# Patient Record
Sex: FEMALE | Race: BLACK OR AFRICAN AMERICAN | Employment: FULL TIME | URBAN - METROPOLITAN AREA
[De-identification: names, ages, dates, MRNs, and addresses within clinical notes are randomized per-mention and may not be internally consistent; named-entity substitution may affect disease eponyms.]

---

## 2023-01-04 ENCOUNTER — HOSPITAL ENCOUNTER (OUTPATIENT)
Dept: PREADMISSION TESTING | Age: 48
Discharge: HOME | End: 2023-01-04
Payer: COMMERCIAL

## 2023-01-04 VITALS
HEART RATE: 86 BPM | HEIGHT: 62 IN | DIASTOLIC BLOOD PRESSURE: 74 MMHG | SYSTOLIC BLOOD PRESSURE: 122 MMHG | WEIGHT: 105.2 LBS | TEMPERATURE: 97.5 F | OXYGEN SATURATION: 99 % | BODY MASS INDEX: 19.36 KG/M2 | RESPIRATION RATE: 16 BRPM

## 2023-01-04 LAB
25(OH)D3 SERPL-MCNC: 13.3 NG/ML (ref 30–100)
ABO + RH BLD: NORMAL
ALBUMIN SERPL-MCNC: 4 G/DL (ref 3.5–5)
ALBUMIN/GLOB SERPL: 1.1 {RATIO} (ref 1.1–2.2)
ALP SERPL-CCNC: 58 U/L (ref 45–117)
ALT SERPL-CCNC: 32 U/L (ref 12–78)
ANION GAP SERPL CALC-SCNC: 6 MMOL/L (ref 5–15)
APPEARANCE UR: CLEAR
APTT PPP: 28.5 SEC (ref 22.1–31)
AST SERPL-CCNC: 20 U/L (ref 15–37)
BACTERIA URNS QL MICRO: ABNORMAL /HPF
BASOPHILS # BLD: 0.1 K/UL (ref 0–0.1)
BASOPHILS NFR BLD: 1 % (ref 0–1)
BILIRUB SERPL-MCNC: 1.3 MG/DL (ref 0.2–1)
BILIRUB UR QL: NEGATIVE
BLOOD GROUP ANTIBODIES SERPL: NORMAL
BUN SERPL-MCNC: 9 MG/DL (ref 6–20)
BUN/CREAT SERPL: 12 (ref 12–20)
CALCIUM SERPL-MCNC: 9.2 MG/DL (ref 8.5–10.1)
CHLORIDE SERPL-SCNC: 102 MMOL/L (ref 97–108)
CO2 SERPL-SCNC: 33 MMOL/L (ref 21–32)
COLOR UR: ABNORMAL
CREAT SERPL-MCNC: 0.75 MG/DL (ref 0.55–1.02)
DIFFERENTIAL METHOD BLD: ABNORMAL
EOSINOPHIL # BLD: 0.9 K/UL (ref 0–0.4)
EOSINOPHIL NFR BLD: 12 % (ref 0–7)
EPITH CASTS URNS QL MICRO: ABNORMAL /LPF
ERYTHROCYTE [DISTWIDTH] IN BLOOD BY AUTOMATED COUNT: 12.9 % (ref 11.5–14.5)
EST. AVERAGE GLUCOSE BLD GHB EST-MCNC: 108 MG/DL
GLOBULIN SER CALC-MCNC: 3.5 G/DL (ref 2–4)
GLUCOSE SERPL-MCNC: 77 MG/DL (ref 65–100)
GLUCOSE UR STRIP.AUTO-MCNC: NEGATIVE MG/DL
HBA1C MFR BLD: 5.4 % (ref 4–5.6)
HCT VFR BLD AUTO: 43.8 % (ref 35–47)
HGB BLD-MCNC: 14.3 G/DL (ref 11.5–16)
HGB UR QL STRIP: NEGATIVE
HYALINE CASTS URNS QL MICRO: ABNORMAL /LPF (ref 0–2)
IMM GRANULOCYTES # BLD AUTO: 0 K/UL (ref 0–0.04)
IMM GRANULOCYTES NFR BLD AUTO: 0 % (ref 0–0.5)
INR PPP: 1 (ref 0.9–1.1)
KETONES UR QL STRIP.AUTO: NEGATIVE MG/DL
LEUKOCYTE ESTERASE UR QL STRIP.AUTO: ABNORMAL
LYMPHOCYTES # BLD: 2.2 K/UL (ref 0.8–3.5)
LYMPHOCYTES NFR BLD: 29 % (ref 12–49)
MCH RBC QN AUTO: 28.8 PG (ref 26–34)
MCHC RBC AUTO-ENTMCNC: 32.6 G/DL (ref 30–36.5)
MCV RBC AUTO: 88.3 FL (ref 80–99)
MONOCYTES # BLD: 0.4 K/UL (ref 0–1)
MONOCYTES NFR BLD: 5 % (ref 5–13)
NEUTS SEG # BLD: 3.9 K/UL (ref 1.8–8)
NEUTS SEG NFR BLD: 53 % (ref 32–75)
NITRITE UR QL STRIP.AUTO: NEGATIVE
NRBC # BLD: 0 K/UL (ref 0–0.01)
NRBC BLD-RTO: 0 PER 100 WBC
PH UR STRIP: 6.5 [PH] (ref 5–8)
PLATELET # BLD AUTO: 269 K/UL (ref 150–400)
PMV BLD AUTO: 9.9 FL (ref 8.9–12.9)
POTASSIUM SERPL-SCNC: 3.3 MMOL/L (ref 3.5–5.1)
PROT SERPL-MCNC: 7.5 G/DL (ref 6.4–8.2)
PROT UR STRIP-MCNC: NEGATIVE MG/DL
PROTHROMBIN TIME: 10.7 SEC (ref 9–11.1)
RBC # BLD AUTO: 4.96 M/UL (ref 3.8–5.2)
RBC #/AREA URNS HPF: ABNORMAL /HPF (ref 0–5)
SODIUM SERPL-SCNC: 141 MMOL/L (ref 136–145)
SP GR UR REFRACTOMETRY: 1.02 (ref 1–1.03)
SPECIMEN EXP DATE BLD: NORMAL
THERAPEUTIC RANGE,PTTT: NORMAL SECS (ref 58–77)
UA: UC IF INDICATED,UAUC: ABNORMAL
UROBILINOGEN UR QL STRIP.AUTO: 1 EU/DL (ref 0.2–1)
WBC # BLD AUTO: 7.4 K/UL (ref 3.6–11)
WBC URNS QL MICRO: ABNORMAL /HPF (ref 0–4)

## 2023-01-04 PROCEDURE — 36415 COLL VENOUS BLD VENIPUNCTURE: CPT

## 2023-01-04 PROCEDURE — 86900 BLOOD TYPING SEROLOGIC ABO: CPT

## 2023-01-04 PROCEDURE — 85025 COMPLETE CBC W/AUTO DIFF WBC: CPT

## 2023-01-04 PROCEDURE — 83036 HEMOGLOBIN GLYCOSYLATED A1C: CPT

## 2023-01-04 PROCEDURE — 85610 PROTHROMBIN TIME: CPT

## 2023-01-04 PROCEDURE — 82306 VITAMIN D 25 HYDROXY: CPT

## 2023-01-04 PROCEDURE — 81001 URINALYSIS AUTO W/SCOPE: CPT

## 2023-01-04 PROCEDURE — 93005 ELECTROCARDIOGRAM TRACING: CPT

## 2023-01-04 PROCEDURE — 85730 THROMBOPLASTIN TIME PARTIAL: CPT

## 2023-01-04 PROCEDURE — 80053 COMPREHEN METABOLIC PANEL: CPT

## 2023-01-04 RX ORDER — LOSARTAN POTASSIUM 50 MG/1
50 TABLET ORAL EVERY EVENING
COMMUNITY

## 2023-01-04 RX ORDER — OMEPRAZOLE 40 MG/1
40 CAPSULE, DELAYED RELEASE ORAL DAILY
COMMUNITY

## 2023-01-04 RX ORDER — AZITHROMYCIN 250 MG/1
250 TABLET, FILM COATED ORAL DAILY
COMMUNITY

## 2023-01-04 RX ORDER — ATORVASTATIN CALCIUM 10 MG/1
10 TABLET, FILM COATED ORAL EVERY EVENING
COMMUNITY

## 2023-01-04 RX ORDER — CETIRIZINE HYDROCHLORIDE 10 MG/1
10 TABLET ORAL DAILY
COMMUNITY

## 2023-01-04 RX ORDER — MONTELUKAST SODIUM 10 MG/1
10 TABLET ORAL EVERY EVENING
COMMUNITY

## 2023-01-04 RX ORDER — HYDROCHLOROTHIAZIDE 12.5 MG/1
12.5 TABLET ORAL DAILY
COMMUNITY

## 2023-01-04 NOTE — PERIOP NOTES
[address] [facility] [address], [address] [facility]   MAIN OR                                  [phone]   MAIN PRE OP                          [phone]                                                                                AMBULATORY PRE OP          [phone]  PRE-ADMISSION TESTING    [phone]   Surgery Date:  1/17/23 tuesday       Is surgery arrival time given by surgeon? NO  If NO, [facility] staff will call you between 3 and 7pm the day before your surgery with your arrival time. (If your surgery is on a Monday, we will call you the Friday before.)    Call [phone] after 7pm Monday-Friday if you did not receive this call. INSTRUCTIONS BEFORE YOUR SURGERY   When You  Arrive Arrive at the [address] [facility] on the day of your surgery  Have your insurance card, photo ID, and any copayment (if needed)   Food   and   Drink NO food or drink after midnight the night before surgery    This means NO water, gum, mints, coffee, juice, etc.  No alcohol (beer, wine, liquor) 24 hours before and after surgery   Medications to   TAKE   Morning of Surgery MEDICATIONS TO TAKE THE MORNING OF SURGERY WITH A SIP OF WATER:   Albuterol if needed  Azelastine nasal spray  Omeprazole  Zyrtec  Fluticasone     Medications  To  STOP      7 days before surgery Non-Steroidal anti-inflammatory Drugs (NSAID's): for example, Ibuprofen (Advil, Motrin), Naproxen (Aleve)  Aspirin, if taking for pain   Herbal supplements, vitamins, and fish oil  Other:  (Pain medications not listed above, including Tylenol may be taken)   Blood  Thinners If you take  Aspirin, Plavix, Coumadin, or any blood-thinning or anti-blood clot medicine, talk to the doctor who prescribed the medications for pre-operative instructions.    Bathing Clothing  Jewelry  Valuables     If you shower the morning of surgery, please do not apply anything to your skin (lotions, powders, deodorant, or makeup, especially mascara)  Follow Chlorhexidine Care Fusion body wash instructions provided to you during PAT appointment. Begin 3 days prior to surgery. Do not shave or trim anywhere 24 hours before surgery  Wear your hair loose or down; no pony-tails, buns, or metal hair clips  Wear loose, comfortable, clean clothes  Wear glasses instead of contacts  Leave money, valuables, and jewelry, including body piercings, at home  If you were given an [employer], bring it on day of surgery. Going Home - or Spending the Night SAME-DAY SURGERY: You must have a responsible adult drive you home and stay with you 24 hours after surgery  ADMITS: If your doctor is keeping you in the hospital after surgery, leave personal belongings/luggage in your car until you have a hospital room number. Hospital discharge time is 12 noon  Drivers must be here before 12 noon unless you are told differently   Special Instructions none       Follow all instructions so your surgery wont be cancelled. Please, be on time. If a situation occurs and you are delayed the day of surgery, call [phone] or [phone]. If your physical condition changes (like a fever, cold, flu, etc.) call your surgeon. Home medication(s) reviewed and verified via   LIST   VERBAL   during PAT appointment. The patient was contacted by   IN-PERSON  The patient verbalizes understanding of all instructions and DOES NOT   need reinforcement.

## 2023-01-05 LAB
BACTERIA SPEC CULT: NORMAL
BACTERIA SPEC CULT: NORMAL
SERVICE CMNT-IMP: NORMAL

## 2023-01-08 LAB
ATRIAL RATE: 66 BPM
CALCULATED P AXIS, ECG09: 39 DEGREES
CALCULATED R AXIS, ECG10: 62 DEGREES
CALCULATED T AXIS, ECG11: 54 DEGREES
DIAGNOSIS, 93000: NORMAL
P-R INTERVAL, ECG05: 152 MS
Q-T INTERVAL, ECG07: 392 MS
QRS DURATION, ECG06: 92 MS
QTC CALCULATION (BEZET), ECG08: 410 MS
VENTRICULAR RATE, ECG03: 66 BPM

## 2023-01-10 NOTE — PERIOP NOTES
The patient attended the pre-operative spine class. The content of the class was presented using a power point presentation and visual demonstrations specific for patients undergoing surgical spine procedures of the neck and back. A pre-operative Patient education booklet specific to spine surgery was given to patient. Incentive spirometer and CHG bath kits were demonstrated in class. Day of surgery routine and expectations, hospital routine and expectations, nutrition, alcohol, nicotine, medications, infection control, pain management, DVT precautions and equipment, ice therapy, durable medical equipment, exercises, mobility expectations and precautions, home preparation and safety were reviewed in class. During class the attendees had opportunities to ask questions of the material presented as well as any concerns about their upcoming surgery. My contact information was shared with the patient to provide further information as requested by the patient related to their upcoming surgery.

## 2023-01-10 NOTE — PERIOP NOTES
Attempted to phone the patient to treat low Vitamin D level done 1/4/2023 but the patient's mailbox is full. I also attempted to call the patient's emergency contact who is her mother Basia Lopes and her mailbox for messages is full as well.

## 2023-01-16 ENCOUNTER — ANESTHESIA EVENT (OUTPATIENT)
Dept: SURGERY | Age: 48
End: 2023-01-16
Payer: COMMERCIAL

## 2023-01-17 ENCOUNTER — APPOINTMENT (OUTPATIENT)
Dept: GENERAL RADIOLOGY | Age: 48
End: 2023-01-17
Attending: ORTHOPAEDIC SURGERY
Payer: COMMERCIAL

## 2023-01-17 ENCOUNTER — HOSPITAL ENCOUNTER (OUTPATIENT)
Age: 48
Discharge: HOME | End: 2023-01-19
Attending: ORTHOPAEDIC SURGERY | Admitting: ORTHOPAEDIC SURGERY
Payer: COMMERCIAL

## 2023-01-17 ENCOUNTER — ANESTHESIA (OUTPATIENT)
Dept: SURGERY | Age: 48
End: 2023-01-17
Payer: COMMERCIAL

## 2023-01-17 DIAGNOSIS — M48.02 CERVICAL STENOSIS OF SPINAL CANAL: Primary | ICD-10-CM

## 2023-01-17 LAB — HCG UR QL: NEGATIVE

## 2023-01-17 PROCEDURE — C1762 CONN TISS, HUMAN(INC FASCIA): HCPCS | Performed by: ORTHOPAEDIC SURGERY

## 2023-01-17 PROCEDURE — C1713 ANCHOR/SCREW BN/BN,TIS/BN: HCPCS | Performed by: ORTHOPAEDIC SURGERY

## 2023-01-17 PROCEDURE — 81025 URINE PREGNANCY TEST: CPT

## 2023-01-17 PROCEDURE — 74011000250 HC RX REV CODE- 250: Performed by: SPECIALIST

## 2023-01-17 PROCEDURE — 74011000250 HC RX REV CODE- 250: Performed by: ORTHOPAEDIC SURGERY

## 2023-01-17 PROCEDURE — 77030031139 HC SUT VCRL2 J&J -A: Performed by: ORTHOPAEDIC SURGERY

## 2023-01-17 PROCEDURE — 77030040922 HC BLNKT HYPOTHRM STRY -A

## 2023-01-17 PROCEDURE — 76010000171 HC OR TIME 2 TO 2.5 HR INTENSV-TIER 1: Performed by: ORTHOPAEDIC SURGERY

## 2023-01-17 PROCEDURE — 77030004391 HC BUR FLUT MEDT -C: Performed by: ORTHOPAEDIC SURGERY

## 2023-01-17 PROCEDURE — 74011250636 HC RX REV CODE- 250/636: Performed by: ANESTHESIOLOGY

## 2023-01-17 PROCEDURE — 77030030102 HC BIT DRL PYRNES K2M -B: Performed by: ORTHOPAEDIC SURGERY

## 2023-01-17 PROCEDURE — 76210000016 HC OR PH I REC 1 TO 1.5 HR: Performed by: ORTHOPAEDIC SURGERY

## 2023-01-17 PROCEDURE — 74011250636 HC RX REV CODE- 250/636: Performed by: SPECIALIST

## 2023-01-17 PROCEDURE — 77030002933 HC SUT MCRYL J&J -A: Performed by: ORTHOPAEDIC SURGERY

## 2023-01-17 PROCEDURE — 77030038692 HC WND DEB SYS IRMX -B: Performed by: ORTHOPAEDIC SURGERY

## 2023-01-17 PROCEDURE — 74011250636 HC RX REV CODE- 250/636: Performed by: ORTHOPAEDIC SURGERY

## 2023-01-17 PROCEDURE — 74011250637 HC RX REV CODE- 250/637: Performed by: ORTHOPAEDIC SURGERY

## 2023-01-17 PROCEDURE — 77030040361 HC SLV COMPR DVT MDII -B

## 2023-01-17 PROCEDURE — 77030008684 HC TU ET CUF COVD -B: Performed by: SPECIALIST

## 2023-01-17 PROCEDURE — 74011250636 HC RX REV CODE- 250/636: Performed by: NURSE ANESTHETIST, CERTIFIED REGISTERED

## 2023-01-17 PROCEDURE — 77030012407 HC DRN WND BARD -B: Performed by: ORTHOPAEDIC SURGERY

## 2023-01-17 PROCEDURE — C1889 IMPLANT/INSERT DEVICE, NOC: HCPCS | Performed by: ORTHOPAEDIC SURGERY

## 2023-01-17 PROCEDURE — 77030028271 HC SRGFL HEMSTAT MTRX KT J&J -C: Performed by: ORTHOPAEDIC SURGERY

## 2023-01-17 PROCEDURE — 2709999900 HC NON-CHARGEABLE SUPPLY: Performed by: ORTHOPAEDIC SURGERY

## 2023-01-17 PROCEDURE — 76060000035 HC ANESTHESIA 2 TO 2.5 HR: Performed by: ORTHOPAEDIC SURGERY

## 2023-01-17 DEVICE — ADVANCED BONE GRAFT SUBSTITUTE 5CC
Type: IMPLANTABLE DEVICE | Site: SPINE CERVICAL | Status: FUNCTIONAL
Brand: NANOSS ABGS

## 2023-01-17 DEVICE — SCREW SPNL ST 4X12 MM CONSTRN NS PYRENEES: Type: IMPLANTABLE DEVICE | Site: SPINE CERVICAL | Status: FUNCTIONAL

## 2023-01-17 DEVICE — PLATE SPNL L18MM BILAT ANTR CERV TI 1 LEV CONSTRN LO PROF: Type: IMPLANTABLE DEVICE | Site: SPINE CERVICAL | Status: FUNCTIONAL

## 2023-01-17 DEVICE — ALLOGRAFT BNE MOLD 5 CC VIABLE BNE MTRX VIBONE: Type: IMPLANTABLE DEVICE | Site: SPINE CERVICAL | Status: FUNCTIONAL

## 2023-01-17 DEVICE — CAGE SPNL W17XH8MM D14.5MM 6DEG ANT CERV INTBDY FUS LORD W/: Type: IMPLANTABLE DEVICE | Site: SPINE CERVICAL | Status: FUNCTIONAL

## 2023-01-17 RX ORDER — ALBUTEROL SULFATE 0.83 MG/ML
2.5 SOLUTION RESPIRATORY (INHALATION)
Status: DISCONTINUED | OUTPATIENT
Start: 2023-01-17 | End: 2023-01-19 | Stop reason: HOSPADM

## 2023-01-17 RX ORDER — ONDANSETRON 2 MG/ML
INJECTION INTRAMUSCULAR; INTRAVENOUS AS NEEDED
Status: DISCONTINUED | OUTPATIENT
Start: 2023-01-17 | End: 2023-01-17 | Stop reason: HOSPADM

## 2023-01-17 RX ORDER — ROCURONIUM BROMIDE 10 MG/ML
INJECTION, SOLUTION INTRAVENOUS AS NEEDED
Status: DISCONTINUED | OUTPATIENT
Start: 2023-01-17 | End: 2023-01-17 | Stop reason: HOSPADM

## 2023-01-17 RX ORDER — LIDOCAINE HYDROCHLORIDE 10 MG/ML
0.1 INJECTION, SOLUTION EPIDURAL; INFILTRATION; INTRACAUDAL; PERINEURAL AS NEEDED
Status: DISCONTINUED | OUTPATIENT
Start: 2023-01-17 | End: 2023-01-17 | Stop reason: HOSPADM

## 2023-01-17 RX ORDER — AMOXICILLIN 250 MG
1 CAPSULE ORAL 2 TIMES DAILY
Status: DISCONTINUED | OUTPATIENT
Start: 2023-01-18 | End: 2023-01-19 | Stop reason: HOSPADM

## 2023-01-17 RX ORDER — GLYCOPYRROLATE 0.2 MG/ML
INJECTION INTRAMUSCULAR; INTRAVENOUS AS NEEDED
Status: DISCONTINUED | OUTPATIENT
Start: 2023-01-17 | End: 2023-01-17 | Stop reason: HOSPADM

## 2023-01-17 RX ORDER — ATORVASTATIN CALCIUM 10 MG/1
10 TABLET, FILM COATED ORAL EVERY EVENING
Status: DISCONTINUED | OUTPATIENT
Start: 2023-01-17 | End: 2023-01-19 | Stop reason: HOSPADM

## 2023-01-17 RX ORDER — MIDAZOLAM HYDROCHLORIDE 1 MG/ML
INJECTION, SOLUTION INTRAMUSCULAR; INTRAVENOUS AS NEEDED
Status: DISCONTINUED | OUTPATIENT
Start: 2023-01-17 | End: 2023-01-17 | Stop reason: HOSPADM

## 2023-01-17 RX ORDER — SODIUM CHLORIDE 0.9 % (FLUSH) 0.9 %
5-40 SYRINGE (ML) INJECTION EVERY 8 HOURS
Status: DISCONTINUED | OUTPATIENT
Start: 2023-01-17 | End: 2023-01-17 | Stop reason: HOSPADM

## 2023-01-17 RX ORDER — DIPHENHYDRAMINE HYDROCHLORIDE 50 MG/ML
12.5 INJECTION, SOLUTION INTRAMUSCULAR; INTRAVENOUS
Status: DISCONTINUED | OUTPATIENT
Start: 2023-01-17 | End: 2023-01-19 | Stop reason: HOSPADM

## 2023-01-17 RX ORDER — SODIUM CHLORIDE 0.9 % (FLUSH) 0.9 %
5-40 SYRINGE (ML) INJECTION EVERY 8 HOURS
Status: DISCONTINUED | OUTPATIENT
Start: 2023-01-17 | End: 2023-01-19 | Stop reason: HOSPADM

## 2023-01-17 RX ORDER — ACETAMINOPHEN 325 MG/1
650 TABLET ORAL
Status: DISCONTINUED | OUTPATIENT
Start: 2023-01-17 | End: 2023-01-19 | Stop reason: HOSPADM

## 2023-01-17 RX ORDER — CETIRIZINE HYDROCHLORIDE 10 MG/1
10 TABLET ORAL DAILY
Status: DISCONTINUED | OUTPATIENT
Start: 2023-01-18 | End: 2023-01-19 | Stop reason: HOSPADM

## 2023-01-17 RX ORDER — SODIUM CHLORIDE, SODIUM LACTATE, POTASSIUM CHLORIDE, CALCIUM CHLORIDE 600; 310; 30; 20 MG/100ML; MG/100ML; MG/100ML; MG/100ML
100 INJECTION, SOLUTION INTRAVENOUS CONTINUOUS
Status: DISCONTINUED | OUTPATIENT
Start: 2023-01-17 | End: 2023-01-17 | Stop reason: HOSPADM

## 2023-01-17 RX ORDER — SODIUM CHLORIDE 0.9 % (FLUSH) 0.9 %
5-40 SYRINGE (ML) INJECTION AS NEEDED
Status: DISCONTINUED | OUTPATIENT
Start: 2023-01-17 | End: 2023-01-19 | Stop reason: HOSPADM

## 2023-01-17 RX ORDER — HYDROMORPHONE HYDROCHLORIDE 1 MG/ML
0.5 INJECTION, SOLUTION INTRAMUSCULAR; INTRAVENOUS; SUBCUTANEOUS
Status: ACTIVE | OUTPATIENT
Start: 2023-01-17 | End: 2023-01-18

## 2023-01-17 RX ORDER — NALOXONE HYDROCHLORIDE 0.4 MG/ML
0.2 INJECTION, SOLUTION INTRAMUSCULAR; INTRAVENOUS; SUBCUTANEOUS
Status: DISCONTINUED | OUTPATIENT
Start: 2023-01-17 | End: 2023-01-17 | Stop reason: HOSPADM

## 2023-01-17 RX ORDER — PANTOPRAZOLE SODIUM 40 MG/1
40 TABLET, DELAYED RELEASE ORAL
Status: DISCONTINUED | OUTPATIENT
Start: 2023-01-18 | End: 2023-01-19 | Stop reason: HOSPADM

## 2023-01-17 RX ORDER — SODIUM CHLORIDE, SODIUM LACTATE, POTASSIUM CHLORIDE, CALCIUM CHLORIDE 600; 310; 30; 20 MG/100ML; MG/100ML; MG/100ML; MG/100ML
125 INJECTION, SOLUTION INTRAVENOUS CONTINUOUS
Status: DISCONTINUED | OUTPATIENT
Start: 2023-01-17 | End: 2023-01-17 | Stop reason: HOSPADM

## 2023-01-17 RX ORDER — OXYCODONE HYDROCHLORIDE 5 MG/1
10 TABLET ORAL
Status: DISCONTINUED | OUTPATIENT
Start: 2023-01-17 | End: 2023-01-18

## 2023-01-17 RX ORDER — AZELASTINE 1 MG/ML
1 SPRAY, METERED NASAL DAILY
Status: DISCONTINUED | OUTPATIENT
Start: 2023-01-18 | End: 2023-01-19 | Stop reason: HOSPADM

## 2023-01-17 RX ORDER — MONTELUKAST SODIUM 10 MG/1
10 TABLET ORAL EVERY EVENING
Status: DISCONTINUED | OUTPATIENT
Start: 2023-01-17 | End: 2023-01-19 | Stop reason: HOSPADM

## 2023-01-17 RX ORDER — FENTANYL CITRATE 50 UG/ML
INJECTION, SOLUTION INTRAMUSCULAR; INTRAVENOUS AS NEEDED
Status: DISCONTINUED | OUTPATIENT
Start: 2023-01-17 | End: 2023-01-17 | Stop reason: HOSPADM

## 2023-01-17 RX ORDER — FLUMAZENIL 0.1 MG/ML
0.2 INJECTION INTRAVENOUS
Status: DISCONTINUED | OUTPATIENT
Start: 2023-01-17 | End: 2023-01-17 | Stop reason: HOSPADM

## 2023-01-17 RX ORDER — CYCLOBENZAPRINE HCL 10 MG
10 TABLET ORAL
Status: DISCONTINUED | OUTPATIENT
Start: 2023-01-17 | End: 2023-01-19 | Stop reason: HOSPADM

## 2023-01-17 RX ORDER — OXYCODONE HYDROCHLORIDE 5 MG/1
5 TABLET ORAL
Status: DISCONTINUED | OUTPATIENT
Start: 2023-01-17 | End: 2023-01-18

## 2023-01-17 RX ORDER — FLUTICASONE PROPIONATE 50 MCG
2 SPRAY, SUSPENSION (ML) NASAL 2 TIMES DAILY
Status: DISCONTINUED | OUTPATIENT
Start: 2023-01-17 | End: 2023-01-19 | Stop reason: HOSPADM

## 2023-01-17 RX ORDER — NALOXONE HYDROCHLORIDE 0.4 MG/ML
0.4 INJECTION, SOLUTION INTRAMUSCULAR; INTRAVENOUS; SUBCUTANEOUS AS NEEDED
Status: DISCONTINUED | OUTPATIENT
Start: 2023-01-17 | End: 2023-01-19 | Stop reason: HOSPADM

## 2023-01-17 RX ORDER — SODIUM CHLORIDE, SODIUM LACTATE, POTASSIUM CHLORIDE, CALCIUM CHLORIDE 600; 310; 30; 20 MG/100ML; MG/100ML; MG/100ML; MG/100ML
INJECTION, SOLUTION INTRAVENOUS
Status: DISCONTINUED | OUTPATIENT
Start: 2023-01-17 | End: 2023-01-17 | Stop reason: HOSPADM

## 2023-01-17 RX ORDER — SODIUM CHLORIDE 9 MG/ML
125 INJECTION, SOLUTION INTRAVENOUS CONTINUOUS
Status: DISCONTINUED | OUTPATIENT
Start: 2023-01-17 | End: 2023-01-19 | Stop reason: HOSPADM

## 2023-01-17 RX ORDER — DEXAMETHASONE SODIUM PHOSPHATE 4 MG/ML
INJECTION, SOLUTION INTRA-ARTICULAR; INTRALESIONAL; INTRAMUSCULAR; INTRAVENOUS; SOFT TISSUE AS NEEDED
Status: DISCONTINUED | OUTPATIENT
Start: 2023-01-17 | End: 2023-01-17 | Stop reason: HOSPADM

## 2023-01-17 RX ORDER — HYDROCHLOROTHIAZIDE 25 MG/1
12.5 TABLET ORAL DAILY
Status: DISCONTINUED | OUTPATIENT
Start: 2023-01-18 | End: 2023-01-19 | Stop reason: HOSPADM

## 2023-01-17 RX ORDER — ONDANSETRON 2 MG/ML
4 INJECTION INTRAMUSCULAR; INTRAVENOUS
Status: DISPENSED | OUTPATIENT
Start: 2023-01-17 | End: 2023-01-18

## 2023-01-17 RX ORDER — PROPOFOL 10 MG/ML
INJECTION, EMULSION INTRAVENOUS AS NEEDED
Status: DISCONTINUED | OUTPATIENT
Start: 2023-01-17 | End: 2023-01-17 | Stop reason: HOSPADM

## 2023-01-17 RX ORDER — LOSARTAN POTASSIUM 50 MG/1
50 TABLET ORAL EVERY EVENING
Status: DISCONTINUED | OUTPATIENT
Start: 2023-01-17 | End: 2023-01-19 | Stop reason: HOSPADM

## 2023-01-17 RX ORDER — ONDANSETRON 2 MG/ML
4 INJECTION INTRAMUSCULAR; INTRAVENOUS AS NEEDED
Status: DISCONTINUED | OUTPATIENT
Start: 2023-01-17 | End: 2023-01-17 | Stop reason: HOSPADM

## 2023-01-17 RX ORDER — FACIAL-BODY WIPES
10 EACH TOPICAL DAILY PRN
Status: DISCONTINUED | OUTPATIENT
Start: 2023-01-19 | End: 2023-01-19 | Stop reason: HOSPADM

## 2023-01-17 RX ORDER — HYDROMORPHONE HYDROCHLORIDE 1 MG/ML
.5-1 INJECTION, SOLUTION INTRAMUSCULAR; INTRAVENOUS; SUBCUTANEOUS
Status: DISCONTINUED | OUTPATIENT
Start: 2023-01-17 | End: 2023-01-17 | Stop reason: HOSPADM

## 2023-01-17 RX ORDER — POLYETHYLENE GLYCOL 3350 17 G/17G
17 POWDER, FOR SOLUTION ORAL DAILY
Status: DISCONTINUED | OUTPATIENT
Start: 2023-01-18 | End: 2023-01-19 | Stop reason: HOSPADM

## 2023-01-17 RX ORDER — SODIUM CHLORIDE 0.9 % (FLUSH) 0.9 %
5-40 SYRINGE (ML) INJECTION AS NEEDED
Status: DISCONTINUED | OUTPATIENT
Start: 2023-01-17 | End: 2023-01-17 | Stop reason: HOSPADM

## 2023-01-17 RX ADMIN — PROPOFOL 150 MG: 10 INJECTION, EMULSION INTRAVENOUS at 11:29

## 2023-01-17 RX ADMIN — PROPOFOL 50 MCG/KG/MIN: 10 INJECTION, EMULSION INTRAVENOUS at 11:35

## 2023-01-17 RX ADMIN — FENTANYL CITRATE 50 MCG: 50 INJECTION, SOLUTION INTRAMUSCULAR; INTRAVENOUS at 11:36

## 2023-01-17 RX ADMIN — FENTANYL CITRATE 50 MCG: 50 INJECTION, SOLUTION INTRAMUSCULAR; INTRAVENOUS at 12:13

## 2023-01-17 RX ADMIN — HYDROMORPHONE HYDROCHLORIDE 0.5 MG: 1 INJECTION, SOLUTION INTRAMUSCULAR; INTRAVENOUS; SUBCUTANEOUS at 13:58

## 2023-01-17 RX ADMIN — FENTANYL CITRATE 50 MCG: 50 INJECTION, SOLUTION INTRAMUSCULAR; INTRAVENOUS at 11:25

## 2023-01-17 RX ADMIN — FENTANYL CITRATE 50 MCG: 50 INJECTION, SOLUTION INTRAMUSCULAR; INTRAVENOUS at 13:05

## 2023-01-17 RX ADMIN — ONDANSETRON HYDROCHLORIDE 4 MG: 2 SOLUTION INTRAMUSCULAR; INTRAVENOUS at 12:23

## 2023-01-17 RX ADMIN — VANCOMYCIN HYDROCHLORIDE 1000 MG: 1 INJECTION, POWDER, LYOPHILIZED, FOR SOLUTION INTRAVENOUS at 09:35

## 2023-01-17 RX ADMIN — SODIUM CHLORIDE 125 ML/HR: 9 INJECTION, SOLUTION INTRAVENOUS at 15:43

## 2023-01-17 RX ADMIN — SODIUM CHLORIDE, POTASSIUM CHLORIDE, SODIUM LACTATE AND CALCIUM CHLORIDE: 600; 310; 30; 20 INJECTION, SOLUTION INTRAVENOUS at 12:16

## 2023-01-17 RX ADMIN — HYDROMORPHONE HYDROCHLORIDE 0.5 MG: 1 INJECTION, SOLUTION INTRAMUSCULAR; INTRAVENOUS; SUBCUTANEOUS at 14:16

## 2023-01-17 RX ADMIN — SODIUM CHLORIDE, POTASSIUM CHLORIDE, SODIUM LACTATE AND CALCIUM CHLORIDE: 600; 310; 30; 20 INJECTION, SOLUTION INTRAVENOUS at 11:21

## 2023-01-17 RX ADMIN — ROCURONIUM BROMIDE 30 MG: 10 INJECTION INTRAVENOUS at 11:31

## 2023-01-17 RX ADMIN — VANCOMYCIN HYDROCHLORIDE 1000 MG: 1 INJECTION, POWDER, LYOPHILIZED, FOR SOLUTION INTRAVENOUS at 21:22

## 2023-01-17 RX ADMIN — SODIUM CHLORIDE, PRESERVATIVE FREE 10 ML: 5 INJECTION INTRAVENOUS at 21:23

## 2023-01-17 RX ADMIN — SODIUM CHLORIDE 120 MCG: 9 INJECTION, SOLUTION INTRAVENOUS at 12:53

## 2023-01-17 RX ADMIN — OXYCODONE HYDROCHLORIDE 10 MG: 5 TABLET ORAL at 21:39

## 2023-01-17 RX ADMIN — MIDAZOLAM HYDROCHLORIDE 2 MG: 1 INJECTION, SOLUTION INTRAMUSCULAR; INTRAVENOUS at 11:20

## 2023-01-17 RX ADMIN — LOSARTAN POTASSIUM 50 MG: 50 TABLET, FILM COATED ORAL at 21:22

## 2023-01-17 RX ADMIN — DEXAMETHASONE SODIUM PHOSPHATE 4 MG: 4 INJECTION, SOLUTION INTRAMUSCULAR; INTRAVENOUS at 12:23

## 2023-01-17 RX ADMIN — MONTELUKAST 10 MG: 10 TABLET, FILM COATED ORAL at 21:22

## 2023-01-17 RX ADMIN — OXYCODONE HYDROCHLORIDE 10 MG: 5 TABLET ORAL at 17:40

## 2023-01-17 RX ADMIN — GLYCOPYRROLATE 0.2 MG: 0.2 INJECTION INTRAMUSCULAR; INTRAVENOUS at 11:23

## 2023-01-17 RX ADMIN — ATORVASTATIN CALCIUM 10 MG: 10 TABLET, FILM COATED ORAL at 21:22

## 2023-01-17 RX ADMIN — FLUTICASONE PROPIONATE 2 SPRAY: 50 SPRAY, METERED NASAL at 21:39

## 2023-01-17 RX ADMIN — ALBUTEROL SULFATE 2.5 MG: 2.5 SOLUTION RESPIRATORY (INHALATION) at 17:24

## 2023-01-17 RX ADMIN — FENTANYL CITRATE 50 MCG: 50 INJECTION, SOLUTION INTRAMUSCULAR; INTRAVENOUS at 13:06

## 2023-01-17 RX ADMIN — SODIUM CHLORIDE, POTASSIUM CHLORIDE, SODIUM LACTATE AND CALCIUM CHLORIDE 100 ML/HR: 600; 310; 30; 20 INJECTION, SOLUTION INTRAVENOUS at 09:15

## 2023-01-17 NOTE — OP NOTES
Operative Note    Patient: Verónica Spaulding  YOB: 1975  MRN: [id number]    Date of Procedure: 1/17/2023     Pre-Op Diagnosis: CERVICAL RADICULITIS, CERVICAL SPINAL STENOSIS    Post-Op Diagnosis: Same as preoperative diagnosis. Procedure(s):  C5-C6 ANTERIOR CERVICAL DISCECTOMY AND FUSION WITH INSTRUMENTATION    Surgeon(s):  Tonya Fraire MD    Surgical Assistant: Physician Assistant: CHARLA Adame  Surg Asst-1: Paulina Faulkner    Anesthesia: General     Estimated Blood Loss (mL):  less than 50     Complications: None    Specimens: * No specimens in log *     Implants:   Implant Name Type Inv. Item Serial No.  Lot No. LRB No. Used Action   ALLOGRAFT BNE MOLD 5 CC VIABLE BNE MTRX VIBONE - [id number]  ALLOGRAFT BNE MOLD 5 CC VIABLE BNE MTRX VIBONE [id number] [employer] N/A N/A 1 Implanted   GRAFT BONE SUBSTITUTE 5CC BIOACTIVE NANOSS - SNA  GRAFT BONE SUBSTITUTE 5CC BIOACTIVE NANOSS NA [employer] INC_WD [id number] N/A 1 Implanted   CAGE SPNL [id number] D14.5MM 6DEG ANT CERV INTBDY FUS LORD W/ - SNA  CAGE SPNL [id number] D14.5MM 6DEG ANT CERV INTBDY FUS LORD W/ NA [employer] [id number] N/A 1 Implanted   PLATE SPNL [id number] BILAT ANTR CERV TI 1 LEV CONSTRN LO PROF - SNA  PLATE SPNL [id number] BILAT ANTR CERV TI 1 LEV CONSTRN LO PROF NA K2M INC_WD NA N/A 1 Implanted   SCREW SPNL ST 4X12 MM CONSTRN NS PYRENEES - SNA  SCREW SPNL ST 4X12 MM CONSTRN NS PYRENEES NA ARNALDO SPINE HOWM_WD NA N/A 4 Implanted       Drains:   Hemovac Anterior; Left Neck (Active)       Findings: as above    Detailed Description of Procedure: [address]  [address], [address] [facility]    OPERATIVE REPORT      NAME: Verónica Spaulding    AGE: 52 y.o.     YOB: 1975    MEDICAL RECORD NUMBER: [id number]    DATE OF SURGERY: 1/17/2023    OPERATIVE REPORT     PREOPERATIVE DIAGNOSIS: Cervical stenosis     POSTOPERATIVE DIAGNOSIS: Cervical stenosis    OPERATIVE PROCEDURE: C5 to C6 anterior cervical diskectomy and fusion with instrumentation and application of interbody spacer at C5-C6    SURGEON: Anitra Castillo MD     ASSISTANT: CHARLA Bray     ANESTHESIA: General    COMPLICATIONS: None    ESTIMATED BLOOD LOSS: 40 cc    INSTRUMENTATION: K2M plate, RTI spacer    Specimens - no    INDICATION FOR PROCEDURE: The patient is a very pleasant 52 y.o. female with cervical stenosis. The patient elected to proceed with operative intervention. She was aware of the risks, benefits, and alternatives. She was provided informed consent. PROCEDURE: The patient was identified in the preoperative holding area. The anterior cervical spine was marked by me. She was transferred to the operating room where general anesthesia was given. She was also given perioperative ancef antibiotics. The patient was placed supine on the operating room table. All bony prominences were well-padded. The shoulders were taped. The anterior cervical spine was prepped and draped in the usual standard fashion. We performed a surgical time-out. I made a skin incision on the left side. It was transverse. I exposed the anterior cervical spine. I placed a needle into the disk space to verify our level. I exposed the disc space with electrocautery from uncus to uncus. I brought in the operating room microscope. I performed a diskectomy at C5-C6. I decompressed the spinal cord and nerve roots bilaterally. I prepared the endplates to bleeding bone. We had good hemostasis. I performed trial sizing. I placed a spacer into C5-C6 with the appropriate amount of tension and alignment. The spacer had allograft. I then placed an anterior cervical plate into C5 and C6. The screws were locked to the plate according to the manufacture's specification.  The instrumentation is a standalone device and is separate instrumentation from the device anchoring for the interbody biomechanical device placed into the discectomy defect for purposes of a spinal fusion. We had good hemostasis. I copiously irrigated the entire wound. I placed a deep drain. The wound was closed with 3-0 Vicryl and 4-0 Monocryl. A sterile dressing was applied. The patient was extubated and transferred to the recovery room in good medical condition. The PA assisted with retraction and closure. I, Dr. Mercy Barragan, performed the above procedures.      Mercy Barragan MD  1/17/2023      Electronically Signed by Mercy Barragan MD on 1/17/2023 at 1:16 PM

## 2023-01-17 NOTE — ANESTHESIA POSTPROCEDURE EVALUATION
Procedure(s):  C5-C6 ANTERIOR CERVICAL DISCECTOMY AND FUSION WITH INSTRUMENTATION. general    Anesthesia Post Evaluation      Multimodal analgesia: multimodal analgesia not used between 6 hours prior to anesthesia start to PACU discharge  Patient location during evaluation: bedside  Patient participation: complete - patient participated  Level of consciousness: awake  Pain score: 0  Pain management: satisfactory to patient  Airway patency: patent  Anesthetic complications: no  Cardiovascular status: acceptable  Respiratory status: acceptable  Hydration status: acceptable  Post anesthesia nausea and vomiting:  controlled  Final Post Anesthesia Temperature Assessment:  Normothermia (36.0-37.5 degrees C)      INITIAL Post-op Vital signs:   Vitals Value Taken Time   /67 01/17/23 1440   Temp 36.4 °C (97.5 °F) 01/17/23 1330   Pulse 67 01/17/23 1443   Resp 9 01/17/23 1443   SpO2 97 % 01/17/23 1443   Vitals shown include unvalidated device data.

## 2023-01-17 NOTE — PERIOP NOTES
Dr Ami Ga notified of pt being highly allergic to pcn and per pharmacist they recommend vancomycin instead.  Orders recvd

## 2023-01-17 NOTE — H&P
Date of Surgery Update: Duong Simon was seen and examined. History and physical has been reviewed. The patient has been examined.  There have been no significant clinical changes since the completion of the originally dated History and Physical.    Signed By: Dorcas Calzada MD     January 17, 2023 9:51 AM Dion Ace presents today for   Chief Complaint   Patient presents with    Foot Swelling     Patient reports left foot swelling x 1 day. Patient denies any injury. Coordination of Care:  1. Have you been to the ER, urgent care clinic since your last visit? Hospitalized since your last visit? no    2. Have you seen or consulted any other health care providers outside of the [facility] since your last visit? Include any pap smears or colon screening.  yes

## 2023-01-17 NOTE — PERIOP NOTES
Report given to ALESIA Oreilly RN. Care transferred at this time. Patient transported to preop holding area room 17.

## 2023-01-17 NOTE — ANESTHESIA PREPROCEDURE EVALUATION
Relevant Problems   No relevant active problems       Anesthetic History   No history of anesthetic complications            Review of Systems / Medical History  Patient summary reviewed, nursing notes reviewed and pertinent labs reviewed    Pulmonary            Asthma     Comments: URI several weeks ago, finished antibiotics a couple weeks ago, feels like she is at baseline now   Neuro/Psych   Within defined limits           Cardiovascular    Hypertension                   GI/Hepatic/Renal     GERD: well controlled           Endo/Other  Within defined limits           Other Findings              Physical Exam    Airway  Mallampati: II  TM Distance: 4 - 6 cm  Neck ROM: normal range of motion   Mouth opening: Normal     Cardiovascular    Rhythm: regular  Rate: normal         Dental    Dentition: Lower dentition intact and Upper dentition intact     Pulmonary  Breath sounds clear to auscultation               Abdominal         Other Findings            Anesthetic Plan    ASA: 3  Anesthesia type: general          Induction: Intravenous  Anesthetic plan and risks discussed with: Patient

## 2023-01-17 NOTE — ANESTHESIA POSTPROCEDURE EVALUATION
Procedure(s):  C5-C6 ANTERIOR CERVICAL DISCECTOMY AND FUSION WITH INSTRUMENTATION. general    Anesthesia Post Evaluation        Patient location during evaluation: PACU  Level of consciousness: awake  Pain management: adequate  Airway patency: patent  Anesthetic complications: no  Cardiovascular status: acceptable  Respiratory status: acceptable  Hydration status: acceptable  Post anesthesia nausea and vomiting:  none      INITIAL Post-op Vital signs:   Vitals Value Taken Time   /67 01/17/23 1440   Temp 36.4 °C (97.5 °F) 01/17/23 1330   Pulse 67 01/17/23 1443   Resp 9 01/17/23 1443   SpO2 97 % 01/17/23 1443   Vitals shown include unvalidated device data.

## 2023-01-18 LAB
ANION GAP SERPL CALC-SCNC: 8 MMOL/L (ref 5–15)
BUN SERPL-MCNC: 6 MG/DL (ref 6–20)
BUN/CREAT SERPL: 9 (ref 12–20)
CALCIUM SERPL-MCNC: 8 MG/DL (ref 8.5–10.1)
CHLORIDE SERPL-SCNC: 109 MMOL/L (ref 97–108)
CO2 SERPL-SCNC: 23 MMOL/L (ref 21–32)
CREAT SERPL-MCNC: 0.67 MG/DL (ref 0.55–1.02)
GLUCOSE SERPL-MCNC: 184 MG/DL (ref 65–100)
HGB BLD-MCNC: 11 G/DL (ref 11.5–16)
POTASSIUM SERPL-SCNC: 3.6 MMOL/L (ref 3.5–5.1)
SODIUM SERPL-SCNC: 140 MMOL/L (ref 136–145)

## 2023-01-18 PROCEDURE — 36415 COLL VENOUS BLD VENIPUNCTURE: CPT

## 2023-01-18 PROCEDURE — 74011250636 HC RX REV CODE- 250/636: Performed by: ORTHOPAEDIC SURGERY

## 2023-01-18 PROCEDURE — 74011250637 HC RX REV CODE- 250/637: Performed by: ORTHOPAEDIC SURGERY

## 2023-01-18 PROCEDURE — 94761 N-INVAS EAR/PLS OXIMETRY MLT: CPT

## 2023-01-18 PROCEDURE — 74011250637 HC RX REV CODE- 250/637: Performed by: PHYSICIAN ASSISTANT

## 2023-01-18 PROCEDURE — 85018 HEMOGLOBIN: CPT

## 2023-01-18 PROCEDURE — 80048 BASIC METABOLIC PNL TOTAL CA: CPT

## 2023-01-18 PROCEDURE — 74011000250 HC RX REV CODE- 250: Performed by: ORTHOPAEDIC SURGERY

## 2023-01-18 PROCEDURE — 94640 AIRWAY INHALATION TREATMENT: CPT

## 2023-01-18 PROCEDURE — 74011250636 HC RX REV CODE- 250/636: Performed by: NURSE PRACTITIONER

## 2023-01-18 RX ORDER — HYDROCODONE BITARTRATE AND ACETAMINOPHEN 7.5; 325 MG/1; MG/1
1 TABLET ORAL
Status: DISCONTINUED | OUTPATIENT
Start: 2023-01-18 | End: 2023-01-19 | Stop reason: HOSPADM

## 2023-01-18 RX ORDER — SCOLOPAMINE TRANSDERMAL SYSTEM 1 MG/1
1 PATCH, EXTENDED RELEASE TRANSDERMAL
Status: DISCONTINUED | OUTPATIENT
Start: 2023-01-18 | End: 2023-01-19 | Stop reason: HOSPADM

## 2023-01-18 RX ORDER — HYDROCODONE BITARTRATE AND ACETAMINOPHEN 10; 325 MG/1; MG/1
1 TABLET ORAL
Status: DISCONTINUED | OUTPATIENT
Start: 2023-01-18 | End: 2023-01-19 | Stop reason: HOSPADM

## 2023-01-18 RX ORDER — ONDANSETRON 2 MG/ML
4 INJECTION INTRAMUSCULAR; INTRAVENOUS
Status: DISCONTINUED | OUTPATIENT
Start: 2023-01-18 | End: 2023-01-19 | Stop reason: HOSPADM

## 2023-01-18 RX ADMIN — FLUTICASONE PROPIONATE 2 SPRAY: 50 SPRAY, METERED NASAL at 21:06

## 2023-01-18 RX ADMIN — SODIUM CHLORIDE, PRESERVATIVE FREE 10 ML: 5 INJECTION INTRAVENOUS at 05:49

## 2023-01-18 RX ADMIN — CETIRIZINE HYDROCHLORIDE 10 MG: 10 TABLET, FILM COATED ORAL at 08:33

## 2023-01-18 RX ADMIN — OXYCODONE HYDROCHLORIDE 10 MG: 5 TABLET ORAL at 02:39

## 2023-01-18 RX ADMIN — AZELASTINE HYDROCHLORIDE 1 SPRAY: 137 SPRAY, METERED NASAL at 11:49

## 2023-01-18 RX ADMIN — SODIUM CHLORIDE, PRESERVATIVE FREE 10 ML: 5 INJECTION INTRAVENOUS at 21:06

## 2023-01-18 RX ADMIN — SODIUM CHLORIDE 125 ML/HR: 9 INJECTION, SOLUTION INTRAVENOUS at 17:04

## 2023-01-18 RX ADMIN — SENNOSIDES AND DOCUSATE SODIUM 1 TABLET: 50; 8.6 TABLET ORAL at 08:33

## 2023-01-18 RX ADMIN — MONTELUKAST 10 MG: 10 TABLET, FILM COATED ORAL at 21:06

## 2023-01-18 RX ADMIN — FLUTICASONE PROPIONATE 2 SPRAY: 50 SPRAY, METERED NASAL at 08:36

## 2023-01-18 RX ADMIN — OXYCODONE HYDROCHLORIDE 10 MG: 5 TABLET ORAL at 05:49

## 2023-01-18 RX ADMIN — SODIUM CHLORIDE, PRESERVATIVE FREE 10 ML: 5 INJECTION INTRAVENOUS at 13:56

## 2023-01-18 RX ADMIN — LOSARTAN POTASSIUM 50 MG: 50 TABLET, FILM COATED ORAL at 21:06

## 2023-01-18 RX ADMIN — ATORVASTATIN CALCIUM 10 MG: 10 TABLET, FILM COATED ORAL at 21:06

## 2023-01-18 RX ADMIN — SENNOSIDES AND DOCUSATE SODIUM 1 TABLET: 50; 8.6 TABLET ORAL at 17:04

## 2023-01-18 RX ADMIN — PANTOPRAZOLE SODIUM 40 MG: 40 TABLET, DELAYED RELEASE ORAL at 05:49

## 2023-01-18 RX ADMIN — ONDANSETRON 4 MG: 2 INJECTION INTRAMUSCULAR; INTRAVENOUS at 13:56

## 2023-01-18 RX ADMIN — ALBUTEROL SULFATE 2.5 MG: 2.5 SOLUTION RESPIRATORY (INHALATION) at 00:14

## 2023-01-18 RX ADMIN — ONDANSETRON 4 MG: 2 INJECTION INTRAMUSCULAR; INTRAVENOUS at 07:39

## 2023-01-18 NOTE — PROGRESS NOTES
1/18/23  8:51 AM    CM reviewed EMR, no CM needs noted. [facility] Management Interventions  Mode of Transport at Discharge:  Other (see comment) (Family will transport at [employer])  Transition of Care Consult (CM Consult): Discharge Planning  Support Systems: Parent(s)  Discharge Location  Patient Expects to be Discharged to[de-identified] Home with family assistance

## 2023-01-18 NOTE — PROGRESS NOTES
Bedside and Verbal shift change report given to Yesica Gomes RN (oncoming nurse) by Khari Rodriguez RN (offgoing nurse). Report included the following information SBAR, Kardex, ED Summary, OR Summary, Intake/Output, MAR, and Recent Results.

## 2023-01-18 NOTE — DISCHARGE INSTRUCTIONS
Barber Sanchez MD  [address] [facility]  Office Phone: [phone]  Neck Surgery Discharge Instructions  Activities  You are going home a well person, be as active as possible. Your only exercise should be walking. Start with short frequent walks and increase your walking distance each day. Start with walking twice a day for 5 minutes and increase your distance each day 2-3 minutes until you reach 25 minutes twice a day. Limit the amount of time you sit to 20-30 minute intervals. Sitting for prolonged periods of time will be uncomfortable for you following your surgery. Do not lift anything over five pounds, and do not do any bending or straining. Avoid reaching overhead in this post-operative period  Do not do any neck exercises until you have been instructed by your doctor. When you are in the bed, you may lay on your back or on either side. Do not lie on your stomach. Continue using your incentive spirometer regularly for deep breathing exercises  You may resume sexual relations 3-4 weeks after your surgery, depending on how you are feeling. Diet  You may resume your normal diet. If your throat is sore, you may want to eat soft foods for a few days. Be sure to drink plenty of fluids, it is important to keep yourself hydrated. If you begin having trouble swallowing, call the office immediately. Avoid alcoholic beverages and ABSOLUTELY NO tobacco products. Tobacco products will interfere with your healing. If you continue to use tobacco, you may end up needing another surgery in the future. Medications  Do not take anti-inflammatory medications or aspirin unless instructed by your physician. (such as Advil/Ibuprofen/Motrin, Aleve/Naproxen/Naprosyn, Diclofenac, Celebrex, Meloxicam, Indomethacin, Goody's powder, BC powder etc.)  Take your pain medication as directed. You have been prescribed a muscle relaxer (Flexeril)-take this for posterior neck/shoulder pain/muscle spasms.    You have also been prescribed a narcotic pain medication (Norco). This has Tylenol in it. You should take this for moderate-severe incisional (anterior) neck pain. If your incisional/surgical pain is mild, you may SUBSTITUTE the Norco for plain Tylenol/acetaminophen. It is important to not take Norco with Tylenol, as there is Tylenol already in [address] [facility]. Do not exceed 4000mg of Tylenol in any 24 hour period. **  It is important to have regular bowel movements. Pain medications may cause constipation. Stool softeners, prune juice, and increasing your water and fiber intake may help in preventing constipation. Do NOT take laxatives if at all possible except in severe situations. It can results in a vicious cycle of constipation and diarrhea. Do not be alarmed if you still have some of the same symptoms you had prior to surgery. The nerves often require time to heal after the pressure has been relieved. You may experience pain in your shoulders or between your shoulder blades, which is common after this surgery. The level of pain you experience should improve as your body heals. *** VERY IMPORTANT - PLEASE READ*** Please monitor the supply of your pain medicine closely and if it looks like you're going to run out of pain medicine over the weekend please call the [facility] prior to the weekend to request a refill. The on call physician for nights and weekends CANNOT refill pain medicine. You will either have to wait until Monday morning when the office re-opens or you will have to go to an urgent care/ emergency room if you are in need of pain medicine. Driving  You may not drive or return to work until instructed by your physician. However, you may ride in the car for short periods of time. Neck collar  Wear your neck brace. You may remove it for short breaks, when eating or showering. You must keep the brace on while sleeping and when ambulating.     Showering  You may shower in approximately 5 days after your surgery if your incision is not draining. You may remove your brace during showers. Do not rub or apply lotion or ointments to the incision site. Do not use tub baths, swimming pools or Jacuzzis. Caring for your incision  Keep the clear, plastic dressing on until 3 days after surgery. At that point, if the incision is dry and without drainage, you may keep the wound open to air without cover. You may have steri-strips on your incision (small, white pieces of tape). Do not pull the steri-strips; they will fall off on their own after several days. If you have sutures or staples, they will be removed by home health or when you see your physician. Do not rub or apply any lotions or ointments to your incision site. Follow Up  FEB 9 2023 11:30 AM - Office Visit  [place], [place]       Notify your physician if you develop any of the following conditions:  Fever above 101 degrees for 24 hours. Nausea or vomiting. Severe headache. Inability to urinate. Loss of bowel or bladder control (sudden onset of incontinence). Changes in sensation in your extremities (numbness, tingling, loss of color). Severe pain or pain not relieved by medications. Redness, swelling, or drainage from your incision. Persistent pain in the chest.   Pain in the calf of either leg. Increased weakness (if this is greater than before your surgery). If you have any questions, contact your Orthopaedic Surgeons office. OFFICE OF DR. Pramod Alejandra   [phone]  [place] [address] [address], , [address], [address] [facility]     * WEAR YOUR BRACE AS ADVISED    * NO DRIVING UNTIL YOU ARE CLEARED TO DO SO BY YOUR SURGEON    * LIMIT LIFTING, BENDING AND TWISTING.  NO LIFTING MORE THAN 5 LBS    * MAKE SURE YOU ARE GETTING GOOD NUTRITION (Lean Protein, Vitamin D AND Calcium)    * DO NOT TAKE ANY NSAIDs FOR THE FIRST 3 MONTHS AFTER SURGERY (such as Advil/Ibuprofen/Motrin, Aleve/Naproxen/Naprosyn, Diclofenac, Celebrex, Meloxicam, Indomethacin, Goody's powder, BC powder etc.)    * NO NICOTINE PRODUCTS    * FULLY READ YOUR DISCHARGE INSTRUCTIONS

## 2023-01-18 NOTE — PERIOP NOTES
TRANSFER - OUT REPORT:    Verbal report given to RN Jordon(name) on Dmitry Hartman  being transferred to [facility] for routine post - op       Report consisted of patients Situation, Background, Assessment and   Recommendations(SBAR). Information from the following report(s) SBAR, OR Summary, Procedure Summary, Intake/Output, MAR, and Cardiac Rhythm nsr  was reviewed with the receiving nurse. Lines:   Peripheral IV 01/17/23 Anterior; Left Forearm (Active)   Site Assessment Clean, dry, & intact 01/17/23 1915   Phlebitis Assessment 0 01/17/23 1915   Infiltration Assessment 0 01/17/23 1915   Dressing Status Occlusive;Dry;Clean, dry, & intact 01/17/23 1915   Dressing Type Tape;Transparent 01/17/23 1915   Hub Color/Line Status Pink; Infusing 01/17/23 1915   Action Taken Open ports on tubing capped 01/17/23 1915   Alcohol Cap Used Yes 01/17/23 1535        Opportunity for questions and clarification was provided.       Patient transported with:   Registered Nurse

## 2023-01-18 NOTE — PROGRESS NOTES
Problem: Pain  Goal: *Control of Pain  Outcome: Progressing Towards Goal     Problem: Patient Education: Go to Patient Education Activity  Goal: Patient/Family Education  Outcome: Progressing Towards Goal     Problem: Hypertension  Goal: *Blood pressure within specified parameters  Outcome: Progressing Towards Goal  Goal: *Fluid volume balance  Outcome: Progressing Towards Goal  Goal: *Labs within defined limits  Outcome: Progressing Towards Goal     Problem: Patient Education: Go to Patient Education Activity  Goal: Patient/Family Education  Outcome: Progressing Towards Goal     Problem: Complex Spine Procedure:  Day of Surgery  Goal: Off Pathway (Use only if patient is Off Pathway)  Outcome: Progressing Towards Goal  Goal: Activity/Safety  Outcome: Progressing Towards Goal  Goal: Consults, if ordered  Outcome: Progressing Towards Goal  Goal: Diagnostic Test/Procedures  Outcome: Progressing Towards Goal  Goal: Nutrition/Diet  Outcome: Progressing Towards Goal  Goal: Discharge Planning  Outcome: Progressing Towards Goal  Goal: Medications  Outcome: Progressing Towards Goal  Goal: Respiratory  Outcome: Progressing Towards Goal  Goal: Treatments/Interventions/Procedures  Outcome: Progressing Towards Goal  Goal: Psychosocial  Outcome: Progressing Towards Goal  Goal: *Optimal pain control at patient's stated goal  Outcome: Progressing Towards Goal  Goal: *Demonstrates progressive activity  Outcome: Progressing Towards Goal  Goal: *Respiratory status stable  Outcome: Progressing Towards Goal     Problem: Complex Spine Procedure:  Post Op Day 1  Goal: Off Pathway (Use only if patient is Off Pathway)  Outcome: Progressing Towards Goal  Goal: Activity/Safety  Outcome: Progressing Towards Goal  Goal: Consults, if ordered  Outcome: Progressing Towards Goal  Goal: Diagnostic Test/Procedures  Outcome: Progressing Towards Goal  Goal: Nutrition/Diet  Outcome: Progressing Towards Goal  Goal: Discharge Planning  Outcome: Progressing Towards Goal  Goal: Medications  Outcome: Progressing Towards Goal  Goal: Respiratory  Outcome: Progressing Towards Goal  Goal: Treatments/Interventions/Procedures  Outcome: Progressing Towards Goal  Goal: Psychosocial  Outcome: Progressing Towards Goal  Goal: *Progress independence mobility/activities (eg: Mobility precautions)  Outcome: Progressing Towards Goal  Goal: *Verbalizes/demonstrates understanding of proper body mechanics and use of stabilization device if ordered  Outcome: Progressing Towards Goal  Goal: *Optimal pain control at patient's stated goal  Outcome: Progressing Towards Goal  Goal: *Resumes normal function of bladder and bowel  Outcome: Progressing Towards Goal  Goal: *Hemodynamically stable  Outcome: Progressing Towards Goal  Goal: *Tolerating diet  Outcome: Progressing Towards Goal  Goal: *Labs within defined limits  Outcome: Progressing Towards Goal

## 2023-01-18 NOTE — PROGRESS NOTES
Medicare Outpatient Observation Notice (MOON)/ [place] Outpatient Observation Notice (Santos Borjas) provided to patient/representative with verbal explanation of the notice. Time allotted for questions regarding the notice. Patient /representative provided a completed copy of the MOON/VOON notice. Copy placed on bedside chart.   Evelin Garcia CMS

## 2023-01-18 NOTE — PROGRESS NOTES
Problem: Pain  Goal: *Control of Pain  Outcome: Progressing Towards Goal     Problem: Patient Education: Go to Patient Education Activity  Goal: Patient/Family Education  Outcome: Progressing Towards Goal     Problem: Hypertension  Goal: *Blood pressure within specified parameters  Outcome: Progressing Towards Goal  Goal: *Fluid volume balance  Outcome: Progressing Towards Goal  Goal: *Labs within defined limits  Outcome: Progressing Towards Goal     Problem: Patient Education: Go to Patient Education Activity  Goal: Patient/Family Education  Outcome: Progressing Towards Goal     Problem: Patient Education: Go to Patient Education Activity  Goal: Patient/Family Education  Outcome: Progressing Towards Goal    Problem: Complex Spine Procedure:  Post Op Day 1  Goal: Off Pathway (Use only if patient is Off Pathway)  Outcome: Progressing Towards Goal  Goal: Activity/Safety  Outcome: Progressing Towards Goal  Goal: Consults, if ordered  Outcome: Progressing Towards Goal  Goal: Diagnostic Test/Procedures  Outcome: Progressing Towards Goal  Goal: Nutrition/Diet  Outcome: Progressing Towards Goal  Goal: Discharge Planning  Outcome: Progressing Towards Goal  Goal: Medications  Outcome: Progressing Towards Goal  Goal: Respiratory  Outcome: Progressing Towards Goal  Goal: Treatments/Interventions/Procedures  Outcome: Progressing Towards Goal  Goal: Psychosocial  Outcome: Progressing Towards Goal  Goal: *Progress independence mobility/activities (eg: Mobility precautions)  Outcome: Progressing Towards Goal  Goal: *Verbalizes/demonstrates understanding of proper body mechanics and use of stabilization device if ordered  Outcome: Progressing Towards Goal  Goal: *Optimal pain control at patient's stated goal  Outcome: Progressing Towards Goal  Goal: *Resumes normal function of bladder and bowel  Outcome: Progressing Towards Goal  Goal: *Hemodynamically stable  Outcome: Progressing Towards Goal  Goal: *Tolerating diet  Outcome: Progressing Towards Goal  Goal: *Labs within defined limits  Outcome: Progressing Towards Goal

## 2023-01-18 NOTE — PROGRESS NOTES
ORTHOPAEDIC CERVICAL FUSION PROGRESS NOTE    NAME:     Tierney Liu   :       1975   MRN:       [id number]   DATE:      2023    POD:              1 Day Post-Op  S/P:              Procedure(s):  C5-C6 ANTERIOR CERVICAL DISCECTOMY AND FUSION WITH INSTRUMENTATION    SUBJECTIVE:  C/O sore throat  Also notes having postoperative nausea and vomiting x 2 episodes  Zofran not providing much relief  No arm pain or numbness  Denies headache, chest pain or shortness of breath  Pain controlled    Recent Labs     23  0227   HGB 11.0*      K 3.6   *   CO2 23   BUN 6   CREA 0.67   *     Patient Vitals for the past 12 hrs:   BP Temp Pulse Resp SpO2   23 1202 126/76 98.3 °F (36.8 °C) 80 18 99 %   23 0739 123/77 98 °F (36.7 °C) 78 14 98 %   23 0730 123/77 98 °F (36.7 °C) 78 16 98 %   23 0550 129/79 -- 91 14 --   23 0240 121/60 98 °F (36.7 °C) 88 14 98 %       Exam:  Soft collar inplace / intact  Dressings clean and dry  Positive strength/ROM bilat upper ext.   Neuro intact to sensation  BL UEs NVID      PLAN:  Monitor hemovac drain output  Continue PO pain medications as needed  Maintain SCDs  Advance diet as tolerated  Out of bed w/ assist  Will try scopolamine patch for n/v      [place], [place]  Orthopaedic Surgery  Physician Assistant to Dr. Cy Dos Santos

## 2023-01-19 VITALS
OXYGEN SATURATION: 99 % | BODY MASS INDEX: 19.36 KG/M2 | TEMPERATURE: 97.9 F | HEIGHT: 62 IN | DIASTOLIC BLOOD PRESSURE: 72 MMHG | SYSTOLIC BLOOD PRESSURE: 122 MMHG | WEIGHT: 105.2 LBS | HEART RATE: 82 BPM | RESPIRATION RATE: 18 BRPM

## 2023-01-19 LAB
ANION GAP SERPL CALC-SCNC: 4 MMOL/L (ref 5–15)
BUN SERPL-MCNC: 3 MG/DL (ref 6–20)
BUN/CREAT SERPL: 5 (ref 12–20)
CALCIUM SERPL-MCNC: 8.4 MG/DL (ref 8.5–10.1)
CHLORIDE SERPL-SCNC: 109 MMOL/L (ref 97–108)
CO2 SERPL-SCNC: 28 MMOL/L (ref 21–32)
CREAT SERPL-MCNC: 0.58 MG/DL (ref 0.55–1.02)
GLUCOSE SERPL-MCNC: 96 MG/DL (ref 65–100)
HGB BLD-MCNC: 11.9 G/DL (ref 11.5–16)
POTASSIUM SERPL-SCNC: 3.8 MMOL/L (ref 3.5–5.1)
SODIUM SERPL-SCNC: 141 MMOL/L (ref 136–145)

## 2023-01-19 PROCEDURE — 74011000250 HC RX REV CODE- 250: Performed by: ORTHOPAEDIC SURGERY

## 2023-01-19 PROCEDURE — 80048 BASIC METABOLIC PNL TOTAL CA: CPT

## 2023-01-19 PROCEDURE — 74011250636 HC RX REV CODE- 250/636: Performed by: NURSE PRACTITIONER

## 2023-01-19 PROCEDURE — 85018 HEMOGLOBIN: CPT

## 2023-01-19 PROCEDURE — 94761 N-INVAS EAR/PLS OXIMETRY MLT: CPT

## 2023-01-19 PROCEDURE — 74011250637 HC RX REV CODE- 250/637: Performed by: ORTHOPAEDIC SURGERY

## 2023-01-19 PROCEDURE — 36415 COLL VENOUS BLD VENIPUNCTURE: CPT

## 2023-01-19 RX ORDER — FLUTICASONE PROPIONATE 50 MCG
2 SPRAY, SUSPENSION (ML) NASAL 2 TIMES DAILY
Qty: 1 EACH | Refills: 0 | Status: ON HOLD
Start: 2023-01-19

## 2023-01-19 RX ORDER — ONDANSETRON 4 MG/1
4 TABLET, ORALLY DISINTEGRATING ORAL
Qty: 30 TABLET | Refills: 0 | Status: ON HOLD | OUTPATIENT
Start: 2023-01-19

## 2023-01-19 RX ORDER — HYDROCODONE BITARTRATE AND ACETAMINOPHEN 5; 325 MG/1; MG/1
1-2 TABLET ORAL
Qty: 42 TABLET | Refills: 0 | Status: ON HOLD | OUTPATIENT
Start: 2023-01-19 | End: 2023-01-26

## 2023-01-19 RX ORDER — CYCLOBENZAPRINE HCL 10 MG
10 TABLET ORAL
Qty: 30 TABLET | Refills: 0 | Status: ON HOLD | OUTPATIENT
Start: 2023-01-19

## 2023-01-19 RX ORDER — DOCUSATE SODIUM 100 MG/1
100 CAPSULE, LIQUID FILLED ORAL 2 TIMES DAILY
Qty: 60 CAPSULE | Refills: 0 | Status: ON HOLD | OUTPATIENT
Start: 2023-01-19 | End: 2023-02-18

## 2023-01-19 RX ADMIN — HYDROCHLOROTHIAZIDE 12.5 MG: 25 TABLET ORAL at 08:31

## 2023-01-19 RX ADMIN — POLYETHYLENE GLYCOL 3350 17 G: 17 POWDER, FOR SOLUTION ORAL at 08:27

## 2023-01-19 RX ADMIN — CETIRIZINE HYDROCHLORIDE 10 MG: 10 TABLET, FILM COATED ORAL at 08:27

## 2023-01-19 RX ADMIN — FLUTICASONE PROPIONATE 2 SPRAY: 50 SPRAY, METERED NASAL at 08:27

## 2023-01-19 RX ADMIN — SODIUM CHLORIDE 125 ML/HR: 9 INJECTION, SOLUTION INTRAVENOUS at 01:51

## 2023-01-19 RX ADMIN — SENNOSIDES AND DOCUSATE SODIUM 1 TABLET: 50; 8.6 TABLET ORAL at 08:27

## 2023-01-19 RX ADMIN — SODIUM CHLORIDE, PRESERVATIVE FREE 10 ML: 5 INJECTION INTRAVENOUS at 05:56

## 2023-01-19 RX ADMIN — AZELASTINE HYDROCHLORIDE 1 SPRAY: 137 SPRAY, METERED NASAL at 08:27

## 2023-01-19 RX ADMIN — PANTOPRAZOLE SODIUM 40 MG: 40 TABLET, DELAYED RELEASE ORAL at 05:56

## 2023-01-19 NOTE — PROGRESS NOTES
Spiritual Care Partner Volunteer visited patient at [address] in [facility] 4M POST SURG ORT 2 on 1/19/2023  Documented by:  Jens Phillip 87, Lamin 68, [facility]  Staff [address]  Paging service: [phone] (FORREST)

## 2023-01-19 NOTE — PROGRESS NOTES
ORTHOPAEDIC CERVICAL FUSION PROGRESS NOTE    NAME:     Emy Hemphill   :       1975   MRN:       [id number]   DATE:      2023    POD:              2 Days Post-Op  S/P:              Procedure(s):  C5-C6 ANTERIOR CERVICAL DISCECTOMY AND FUSION WITH INSTRUMENTATION    SUBJECTIVE:  Patient resting comfortably in bed on exam.  Nausea/vomiting has resolved. Patient was able to eat a full breakfast. No dysphagia. Hemovac drain has been removed. Patient ambulating around room and to bathroom independently. Voiding without difficulty. Pain is tolerable. Patient taking minimal PRN medications. Recent Labs     23  0201   HGB 11.9      K 3.8   *   CO2 28   BUN 3*   CREA 0.58   GLU 96     Patient Vitals for the past 12 hrs:   BP Temp Pulse Resp SpO2   23 0831 (!) 141/85 99 °F (37.2 °C) 76 16 100 %   23 0501 134/75 98.2 °F (36.8 °C) 74 14 96 %   23 2355 (!) 133/93 98.3 °F (36.8 °C) 74 14 100 %     Exam:  Positive strength/ROM bilat upper ext.   Neuro intact to sensation  Anterior cervical dressing is clean, dry, and intact  Soft cervical collar inplace / intact    PLAN:  D/C to home today  D/C Rx to be electronically prescribed to patient's preferred pharmacy      Shaylee Garza NP

## 2023-01-19 NOTE — PROGRESS NOTES
1/19/2023 12:34 PM     Care Management Interventions  Mode of Transport at Discharge:  Other (see comment) (Family will transport at [employer])  Transition of Care Consult (CM Consult): Discharge Planning  Support Systems: Parent(s)  Discharge Location  Patient Expects to be Discharged to[de-identified] Home with family assistance Anxiety and depression Severe episode of recurrent major depressive disorder, without psychotic features

## 2023-01-19 NOTE — PROGRESS NOTES
Discharge instructions presented to patient with the mother present. All questions and concerns addressed appropriately with patient. IV removed from patient. Patient awaiting transport via wheelchair by volunteer services to the main entrance.

## 2023-01-19 NOTE — PROGRESS NOTES
Problem: Pain  Goal: *Control of Pain  Outcome: Progressing Towards Goal     Problem: Patient Education: Go to Patient Education Activity  Goal: Patient/Family Education  Outcome: Progressing Towards Goal     Problem: Hypertension  Goal: *Blood pressure within specified parameters  Outcome: Progressing Towards Goal  Goal: *Fluid volume balance  Outcome: Progressing Towards Goal  Goal: *Labs within defined limits  Outcome: Progressing Towards Goal     Problem: Patient Education: Go to Patient Education Activity  Goal: Patient/Family Education  Outcome: Progressing Towards Goal     Problem: Patient Education: Go to Patient Education Activity  Goal: Patient/Family Education  Outcome: Progressing Towards Goal     Problem: Complex Spine Procedure:  Post Op Day 2  Goal: Off Pathway (Use only if patient is Off Pathway)  Outcome: Progressing Towards Goal  Goal: Activity/Safety  Outcome: Progressing Towards Goal  Goal: Diagnostic Test/Procedures  Outcome: Progressing Towards Goal  Goal: Nutrition/Diet  Outcome: Progressing Towards Goal  Goal: Discharge Planning  Outcome: Progressing Towards Goal  Goal: Medications  Outcome: Progressing Towards Goal  Goal: Respiratory  Outcome: Progressing Towards Goal  Goal: Treatments/Interventions/Procedures  Outcome: Progressing Towards Goal  Goal: Psychosocial  Outcome: Progressing Towards Goal  Goal: *Progress independence mobility/activities (eg: Mobility precautions)  Outcome: Progressing Towards Goal  Goal: *Verbalizes/demonstrates understanding of proper body mechanics and use of stabilization device if ordered  Outcome: Progressing Towards Goal  Goal: *Optimal pain control at patient's stated goal  Outcome: Progressing Towards Goal  Goal: *Resumes normal function of bladder and bowel  Outcome: Progressing Towards Goal  Goal: *Hemodynamically stable  Outcome: Progressing Towards Goal  Goal: *Tolerating diet  Outcome: Progressing Towards Goal  Goal: *Labs within defined limits  Outcome: Progressing Towards Goal  Goal: *Able to place stabilization device  Outcome: Progressing Towards Goal

## 2023-01-19 NOTE — PROGRESS NOTES
Bedside and Verbal shift change report given to Gavi Little RN (oncoming nurse) by Basil Lyman RN (offgoing nurse). Report included the following information SBAR, Kardex, Intake/Output, MAR, and Recent Results.

## 2023-01-22 ENCOUNTER — APPOINTMENT (OUTPATIENT)
Dept: GENERAL RADIOLOGY | Age: 48
DRG: 300 | End: 2023-01-22
Attending: NURSE PRACTITIONER
Payer: COMMERCIAL

## 2023-01-22 ENCOUNTER — HOSPITAL ENCOUNTER (INPATIENT)
Age: 48
LOS: 4 days | Discharge: HOME | DRG: 300 | End: 2023-01-26
Attending: EMERGENCY MEDICINE | Admitting: HOSPITALIST
Payer: COMMERCIAL

## 2023-01-22 ENCOUNTER — APPOINTMENT (OUTPATIENT)
Dept: CT IMAGING | Age: 48
DRG: 300 | End: 2023-01-22
Attending: NURSE PRACTITIONER
Payer: COMMERCIAL

## 2023-01-22 DIAGNOSIS — Z98.1 STATUS POST CERVICAL SPINAL FUSION: ICD-10-CM

## 2023-01-22 DIAGNOSIS — R00.0 TACHYCARDIA: Primary | ICD-10-CM

## 2023-01-22 DIAGNOSIS — M54.2 NECK PAIN: ICD-10-CM

## 2023-01-22 DIAGNOSIS — I82.90 THROMBUS: ICD-10-CM

## 2023-01-22 DIAGNOSIS — G89.18 POSTOPERATIVE PAIN: ICD-10-CM

## 2023-01-22 LAB
ANION GAP SERPL CALC-SCNC: 3 MMOL/L (ref 5–15)
APTT PPP: 22.5 SEC (ref 22.1–31)
BASOPHILS # BLD: 0 K/UL (ref 0–0.1)
BASOPHILS # BLD: 0 K/UL (ref 0–0.1)
BASOPHILS NFR BLD: 0 % (ref 0–1)
BASOPHILS NFR BLD: 0 % (ref 0–1)
BUN SERPL-MCNC: 12 MG/DL (ref 6–20)
BUN/CREAT SERPL: 17 (ref 12–20)
CALCIUM SERPL-MCNC: 9.3 MG/DL (ref 8.5–10.1)
CHLORIDE SERPL-SCNC: 102 MMOL/L (ref 97–108)
CO2 SERPL-SCNC: 31 MMOL/L (ref 21–32)
COMMENT, HOLDF: NORMAL
COMMENT, HOLDF: NORMAL
COVID-19 RAPID TEST, COVR: NOT DETECTED
CREAT SERPL-MCNC: 0.71 MG/DL (ref 0.55–1.02)
DIFFERENTIAL METHOD BLD: ABNORMAL
DIFFERENTIAL METHOD BLD: NORMAL
EOSINOPHIL # BLD: 0.1 K/UL (ref 0–0.4)
EOSINOPHIL # BLD: 0.1 K/UL (ref 0–0.4)
EOSINOPHIL NFR BLD: 1 % (ref 0–7)
EOSINOPHIL NFR BLD: 1 % (ref 0–7)
ERYTHROCYTE [DISTWIDTH] IN BLOOD BY AUTOMATED COUNT: 12.7 % (ref 11.5–14.5)
ERYTHROCYTE [DISTWIDTH] IN BLOOD BY AUTOMATED COUNT: 12.7 % (ref 11.5–14.5)
GLUCOSE SERPL-MCNC: 99 MG/DL (ref 65–100)
HCT VFR BLD AUTO: 38 % (ref 35–47)
HCT VFR BLD AUTO: 41.3 % (ref 35–47)
HGB BLD-MCNC: 12.6 G/DL (ref 11.5–16)
HGB BLD-MCNC: 13.7 G/DL (ref 11.5–16)
IMM GRANULOCYTES # BLD AUTO: 0 K/UL (ref 0–0.04)
IMM GRANULOCYTES # BLD AUTO: 0 K/UL (ref 0–0.04)
IMM GRANULOCYTES NFR BLD AUTO: 0 % (ref 0–0.5)
IMM GRANULOCYTES NFR BLD AUTO: 0 % (ref 0–0.5)
LYMPHOCYTES # BLD: 1.1 K/UL (ref 0.8–3.5)
LYMPHOCYTES # BLD: 1.6 K/UL (ref 0.8–3.5)
LYMPHOCYTES NFR BLD: 10 % (ref 12–49)
LYMPHOCYTES NFR BLD: 18 % (ref 12–49)
MCH RBC QN AUTO: 29 PG (ref 26–34)
MCH RBC QN AUTO: 29 PG (ref 26–34)
MCHC RBC AUTO-ENTMCNC: 33.2 G/DL (ref 30–36.5)
MCHC RBC AUTO-ENTMCNC: 33.2 G/DL (ref 30–36.5)
MCV RBC AUTO: 87.5 FL (ref 80–99)
MCV RBC AUTO: 87.6 FL (ref 80–99)
MONOCYTES # BLD: 0.6 K/UL (ref 0–1)
MONOCYTES # BLD: 0.6 K/UL (ref 0–1)
MONOCYTES NFR BLD: 5 % (ref 5–13)
MONOCYTES NFR BLD: 7 % (ref 5–13)
NEUTS SEG # BLD: 6.9 K/UL (ref 1.8–8)
NEUTS SEG # BLD: 8.8 K/UL (ref 1.8–8)
NEUTS SEG NFR BLD: 74 % (ref 32–75)
NEUTS SEG NFR BLD: 84 % (ref 32–75)
NRBC # BLD: 0 K/UL (ref 0–0.01)
NRBC # BLD: 0 K/UL (ref 0–0.01)
NRBC BLD-RTO: 0 PER 100 WBC
NRBC BLD-RTO: 0 PER 100 WBC
PLATELET # BLD AUTO: 240 K/UL (ref 150–400)
PLATELET # BLD AUTO: 283 K/UL (ref 150–400)
PMV BLD AUTO: 9.8 FL (ref 8.9–12.9)
PMV BLD AUTO: 9.8 FL (ref 8.9–12.9)
POTASSIUM SERPL-SCNC: 4.2 MMOL/L (ref 3.5–5.1)
RBC # BLD AUTO: 4.34 M/UL (ref 3.8–5.2)
RBC # BLD AUTO: 4.72 M/UL (ref 3.8–5.2)
SAMPLES BEING HELD,HOLD: NORMAL
SAMPLES BEING HELD,HOLD: NORMAL
SODIUM SERPL-SCNC: 136 MMOL/L (ref 136–145)
SOURCE, COVRS: NORMAL
THERAPEUTIC RANGE,PTTT: NORMAL SECS (ref 58–77)
UFH PPP CHRO-ACNC: 0.6 IU/ML
UFH PPP CHRO-ACNC: <0.1 IU/ML
WBC # BLD AUTO: 10.7 K/UL (ref 3.6–11)
WBC # BLD AUTO: 9.2 K/UL (ref 3.6–11)

## 2023-01-22 PROCEDURE — 74011000250 HC RX REV CODE- 250: Performed by: HOSPITALIST

## 2023-01-22 PROCEDURE — 96374 THER/PROPH/DIAG INJ IV PUSH: CPT

## 2023-01-22 PROCEDURE — 93005 ELECTROCARDIOGRAM TRACING: CPT

## 2023-01-22 PROCEDURE — 70491 CT SOFT TISSUE NECK W/DYE: CPT

## 2023-01-22 PROCEDURE — 74011250637 HC RX REV CODE- 250/637: Performed by: INTERNAL MEDICINE

## 2023-01-22 PROCEDURE — 74011250636 HC RX REV CODE- 250/636: Performed by: HOSPITALIST

## 2023-01-22 PROCEDURE — 71046 X-RAY EXAM CHEST 2 VIEWS: CPT

## 2023-01-22 PROCEDURE — 99285 EMERGENCY DEPT VISIT HI MDM: CPT

## 2023-01-22 PROCEDURE — 87635 SARS-COV-2 COVID-19 AMP PRB: CPT

## 2023-01-22 PROCEDURE — 96375 TX/PRO/DX INJ NEW DRUG ADDON: CPT

## 2023-01-22 PROCEDURE — 94761 N-INVAS EAR/PLS OXIMETRY MLT: CPT

## 2023-01-22 PROCEDURE — 36415 COLL VENOUS BLD VENIPUNCTURE: CPT

## 2023-01-22 PROCEDURE — 85520 HEPARIN ASSAY: CPT

## 2023-01-22 PROCEDURE — 74011000636 HC RX REV CODE- 636: Performed by: RADIOLOGY

## 2023-01-22 PROCEDURE — 65610000006 HC RM INTENSIVE CARE

## 2023-01-22 PROCEDURE — 74011250637 HC RX REV CODE- 250/637: Performed by: HOSPITALIST

## 2023-01-22 PROCEDURE — 80048 BASIC METABOLIC PNL TOTAL CA: CPT

## 2023-01-22 PROCEDURE — 74011250636 HC RX REV CODE- 250/636: Performed by: NURSE PRACTITIONER

## 2023-01-22 PROCEDURE — 85730 THROMBOPLASTIN TIME PARTIAL: CPT

## 2023-01-22 PROCEDURE — 85025 COMPLETE CBC W/AUTO DIFF WBC: CPT

## 2023-01-22 RX ORDER — IPRATROPIUM BROMIDE AND ALBUTEROL SULFATE 2.5; .5 MG/3ML; MG/3ML
3 SOLUTION RESPIRATORY (INHALATION)
Status: DISCONTINUED | OUTPATIENT
Start: 2023-01-22 | End: 2023-01-26 | Stop reason: HOSPADM

## 2023-01-22 RX ORDER — BUDESONIDE 0.5 MG/2ML
500 INHALANT ORAL
Status: DISCONTINUED | OUTPATIENT
Start: 2023-01-22 | End: 2023-01-26 | Stop reason: HOSPADM

## 2023-01-22 RX ORDER — SODIUM CHLORIDE, SODIUM LACTATE, POTASSIUM CHLORIDE, CALCIUM CHLORIDE 600; 310; 30; 20 MG/100ML; MG/100ML; MG/100ML; MG/100ML
75 INJECTION, SOLUTION INTRAVENOUS CONTINUOUS
Status: DISCONTINUED | OUTPATIENT
Start: 2023-01-22 | End: 2023-01-23

## 2023-01-22 RX ORDER — FAMOTIDINE 10 MG/ML
20 INJECTION INTRAVENOUS EVERY 12 HOURS
Status: DISCONTINUED | OUTPATIENT
Start: 2023-01-22 | End: 2023-01-22

## 2023-01-22 RX ORDER — HYDROMORPHONE HYDROCHLORIDE 1 MG/ML
0.2 INJECTION, SOLUTION INTRAMUSCULAR; INTRAVENOUS; SUBCUTANEOUS
Status: DISCONTINUED | OUTPATIENT
Start: 2023-01-22 | End: 2023-01-23

## 2023-01-22 RX ORDER — LOSARTAN POTASSIUM 50 MG/1
50 TABLET ORAL EVERY EVENING
Status: DISCONTINUED | OUTPATIENT
Start: 2023-01-22 | End: 2023-01-26 | Stop reason: HOSPADM

## 2023-01-22 RX ORDER — OXYCODONE HYDROCHLORIDE 5 MG/1
10 TABLET ORAL
Status: DISCONTINUED | OUTPATIENT
Start: 2023-01-22 | End: 2023-01-22

## 2023-01-22 RX ORDER — SODIUM CHLORIDE 0.9 % (FLUSH) 0.9 %
5-40 SYRINGE (ML) INJECTION EVERY 8 HOURS
Status: DISCONTINUED | OUTPATIENT
Start: 2023-01-22 | End: 2023-01-26 | Stop reason: HOSPADM

## 2023-01-22 RX ORDER — ACETAMINOPHEN 650 MG/1
650 SUPPOSITORY RECTAL
Status: DISCONTINUED | OUTPATIENT
Start: 2023-01-22 | End: 2023-01-26 | Stop reason: HOSPADM

## 2023-01-22 RX ORDER — ALBUTEROL SULFATE 0.83 MG/ML
2.5 SOLUTION RESPIRATORY (INHALATION)
Status: DISCONTINUED | OUTPATIENT
Start: 2023-01-22 | End: 2023-01-22

## 2023-01-22 RX ORDER — HEPARIN SODIUM 1000 [USP'U]/ML
80 INJECTION, SOLUTION INTRAVENOUS; SUBCUTANEOUS ONCE
Status: COMPLETED | OUTPATIENT
Start: 2023-01-22 | End: 2023-01-22

## 2023-01-22 RX ORDER — HYDROMORPHONE HYDROCHLORIDE 1 MG/ML
1 INJECTION, SOLUTION INTRAMUSCULAR; INTRAVENOUS; SUBCUTANEOUS
Status: DISCONTINUED | OUTPATIENT
Start: 2023-01-22 | End: 2023-01-23

## 2023-01-22 RX ORDER — HYDROMORPHONE HYDROCHLORIDE 1 MG/ML
0.5 INJECTION, SOLUTION INTRAMUSCULAR; INTRAVENOUS; SUBCUTANEOUS ONCE
Status: COMPLETED | OUTPATIENT
Start: 2023-01-22 | End: 2023-01-22

## 2023-01-22 RX ORDER — ACETAMINOPHEN 325 MG/1
650 TABLET ORAL
Status: DISCONTINUED | OUTPATIENT
Start: 2023-01-22 | End: 2023-01-26 | Stop reason: HOSPADM

## 2023-01-22 RX ORDER — HYDRALAZINE HYDROCHLORIDE 20 MG/ML
20 INJECTION INTRAMUSCULAR; INTRAVENOUS
Status: DISCONTINUED | OUTPATIENT
Start: 2023-01-22 | End: 2023-01-24

## 2023-01-22 RX ORDER — ONDANSETRON 2 MG/ML
4 INJECTION INTRAMUSCULAR; INTRAVENOUS
Status: DISCONTINUED | OUTPATIENT
Start: 2023-01-22 | End: 2023-01-26 | Stop reason: HOSPADM

## 2023-01-22 RX ORDER — POLYETHYLENE GLYCOL 3350 17 G/17G
17 POWDER, FOR SOLUTION ORAL DAILY PRN
Status: DISCONTINUED | OUTPATIENT
Start: 2023-01-22 | End: 2023-01-26 | Stop reason: HOSPADM

## 2023-01-22 RX ORDER — ATORVASTATIN CALCIUM 10 MG/1
10 TABLET, FILM COATED ORAL EVERY EVENING
Status: DISCONTINUED | OUTPATIENT
Start: 2023-01-22 | End: 2023-01-26 | Stop reason: HOSPADM

## 2023-01-22 RX ORDER — BENZONATATE 100 MG/1
100 CAPSULE ORAL 3 TIMES DAILY
Status: DISCONTINUED | OUTPATIENT
Start: 2023-01-22 | End: 2023-01-26 | Stop reason: HOSPADM

## 2023-01-22 RX ORDER — SODIUM CHLORIDE 0.9 % (FLUSH) 0.9 %
5-40 SYRINGE (ML) INJECTION AS NEEDED
Status: DISCONTINUED | OUTPATIENT
Start: 2023-01-22 | End: 2023-01-26 | Stop reason: HOSPADM

## 2023-01-22 RX ORDER — BENZONATATE 100 MG/1
100 CAPSULE ORAL
Status: DISCONTINUED | OUTPATIENT
Start: 2023-01-22 | End: 2023-01-22

## 2023-01-22 RX ORDER — HEPARIN SODIUM 10000 [USP'U]/100ML
18-36 INJECTION, SOLUTION INTRAVENOUS
Status: DISCONTINUED | OUTPATIENT
Start: 2023-01-22 | End: 2023-01-24

## 2023-01-22 RX ORDER — MORPHINE SULFATE 4 MG/ML
4 INJECTION INTRAVENOUS ONCE
Status: COMPLETED | OUTPATIENT
Start: 2023-01-22 | End: 2023-01-22

## 2023-01-22 RX ADMIN — HYDROMORPHONE HYDROCHLORIDE 1 MG: 1 INJECTION, SOLUTION INTRAMUSCULAR; INTRAVENOUS; SUBCUTANEOUS at 18:41

## 2023-01-22 RX ADMIN — MORPHINE SULFATE 4 MG: 4 INJECTION INTRAVENOUS at 12:01

## 2023-01-22 RX ADMIN — ATORVASTATIN CALCIUM 10 MG: 20 TABLET, FILM COATED ORAL at 22:35

## 2023-01-22 RX ADMIN — SODIUM CHLORIDE 1000 ML: 9 INJECTION, SOLUTION INTRAVENOUS at 11:57

## 2023-01-22 RX ADMIN — Medication 10 ML: at 16:40

## 2023-01-22 RX ADMIN — LOSARTAN POTASSIUM 50 MG: 50 TABLET, FILM COATED ORAL at 22:35

## 2023-01-22 RX ADMIN — BENZONATATE 100 MG: 100 CAPSULE ORAL at 22:03

## 2023-01-22 RX ADMIN — IOPAMIDOL 100 ML: 755 INJECTION, SOLUTION INTRAVENOUS at 11:33

## 2023-01-22 RX ADMIN — Medication 10 ML: at 22:37

## 2023-01-22 RX ADMIN — BENZONATATE 100 MG: 100 CAPSULE ORAL at 17:56

## 2023-01-22 RX ADMIN — FAMOTIDINE 20 MG: 10 INJECTION, SOLUTION INTRAVENOUS at 20:40

## 2023-01-22 RX ADMIN — HYDROMORPHONE HYDROCHLORIDE 0.5 MG: 1 INJECTION, SOLUTION INTRAMUSCULAR; INTRAVENOUS; SUBCUTANEOUS at 14:01

## 2023-01-22 RX ADMIN — IPRATROPIUM BROMIDE AND ALBUTEROL SULFATE 3 ML: 2.5; .5 SOLUTION RESPIRATORY (INHALATION) at 20:40

## 2023-01-22 RX ADMIN — HEPARIN SODIUM 18 UNITS/KG/HR: 10000 INJECTION, SOLUTION INTRAVENOUS at 16:36

## 2023-01-22 RX ADMIN — HEPARIN SODIUM 3920 UNITS: 1000 INJECTION INTRAVENOUS; SUBCUTANEOUS at 16:35

## 2023-01-22 RX ADMIN — SODIUM CHLORIDE, POTASSIUM CHLORIDE, SODIUM LACTATE AND CALCIUM CHLORIDE 75 ML/HR: 600; 310; 30; 20 INJECTION, SOLUTION INTRAVENOUS at 18:39

## 2023-01-22 RX ADMIN — BUDESONIDE 500 MCG: 0.5 INHALANT RESPIRATORY (INHALATION) at 20:40

## 2023-01-22 NOTE — CONSULTS
ORTHOPEDIC CONSULT    Subjective:     Date of Consultation:  January 22, 2023    Referring Physician:  Dr. Julita Waddell is a 52 y.o.  female who is being seen for neck pain. She is a patient of Dr. Salbador Burns that is s/p ACDF C5-6 on 1/17/2023. She states neck pain worsening after episode of coughing yesterday. She has been compliant in soft collar and ROM restrictions. She states she had bronchitis before surgery and suffers from asthma. She also endorses hoarseness which she relates to phlegm/ congestion and has a headache. Denies shortness of breath, difficulty breathing or swallowing. Pain is along the lateral and posterior left cervical spine and does have some pain when moving her left arm. Denies any paraesthesias of arms or legs. No changes in bowel or bladder. Patient Active Problem List    Diagnosis Date Noted    Tachycardia 01/22/2023    Cervical stenosis of spinal canal 01/17/2023     No family history on file. Social History     Tobacco Use    Smoking status: Never    Smokeless tobacco: Never   Substance Use Topics    Alcohol use: Not Currently     Past Medical History:   Diagnosis Date    Adverse effect of anesthesia     \"hard time waking up after tubal liogation\"    Asthma     Hyperlipidemia     Hypertension       Past Surgical History:   Procedure Laterality Date    HX COLONOSCOPY      HX GYN  2016    tubal ligation    HX ORTHOPAEDIC Left 2010    foot sx x2 same year    HX ORTHOPAEDIC Left 2010    knee cyst removal      Prior to Admission medications    Medication Sig Start Date End Date Taking? Authorizing Provider   cyclobenzaprine (FLEXERIL) 10 mg tablet Take 1 Tablet by mouth three (3) times daily as needed for Muscle Spasm(s). 1/19/23   Debra Lepe NP   ondansetron (ZOFRAN ODT) 4 mg disintegrating tablet Take 1 Tablet by mouth every six (6) hours as needed for Nausea or Vomiting.  1/19/23   Debra Lepe NP   HYDROcodone-acetaminophen [facility]) 5-325 mg per tablet Take 1-2 Tablets by mouth every four (4) hours as needed for Pain for up to 7 days. Max Daily Amount: 12 Tablets. 1/19/23 1/26/23  Tesfaye Santana NP   docusate sodium (COLACE) 100 mg capsule Take 1 Capsule by mouth two (2) times a day for 30 days. Indications: constipation 1/19/23 2/18/23  Tesfaye Santana NP   fluticasone propionate (FLONASE) 50 mcg/actuation nasal spray 2 Sprays by Both Nostrils route two (2) times a day. 1/19/23   Tesfaye Santana NP   azelastine HCl (AZELASTINE NA) by Nasal route daily. Daily in AM    Provider, Historical   omeprazole (PRILOSEC) 40 mg capsule Take 40 mg by mouth daily. Daily in AM    Provider, Historical   hydroCHLOROthiazide (HYDRODIURIL) 12.5 mg tablet Take 12.5 mg by mouth daily. Daily in AM    Provider, Historical   atorvastatin (LIPITOR) 10 mg tablet Take 10 mg by mouth every evening. Daily in PM    Provider, Historical   losartan (COZAAR) 50 mg tablet Take 50 mg by mouth every evening. Daily in PM    Provider, Historical   montelukast (SINGULAIR) 10 mg tablet Take 10 mg by mouth every evening. Daily in PM    Provider, Historical   cetirizine (ZYRTEC) 10 mg tablet Take 10 mg by mouth daily. Daily in AM    Provider, Historical   ALBUTEROL SULFATE IN Take  by inhalation as needed. 0.5mg/3mg per 3mL    Provider, Historical     No current facility-administered medications for this encounter. Current Outpatient Medications   Medication Sig    cyclobenzaprine (FLEXERIL) 10 mg tablet Take 1 Tablet by mouth three (3) times daily as needed for Muscle Spasm(s). ondansetron (ZOFRAN ODT) 4 mg disintegrating tablet Take 1 Tablet by mouth every six (6) hours as needed for Nausea or Vomiting. HYDROcodone-acetaminophen (NORCO) 5-325 mg per tablet Take 1-2 Tablets by mouth every four (4) hours as needed for Pain for up to 7 days. Max Daily Amount: 12 Tablets. docusate sodium (COLACE) 100 mg capsule Take 1 Capsule by mouth two (2) times a day for 30 days. Indications: constipation    fluticasone propionate (FLONASE) 50 mcg/actuation nasal spray 2 Sprays by Both Nostrils route two (2) times a day. azelastine HCl (AZELASTINE NA) by Nasal route daily. Daily in AM    omeprazole (PRILOSEC) 40 mg capsule Take 40 mg by mouth daily. Daily in AM    hydroCHLOROthiazide (HYDRODIURIL) 12.5 mg tablet Take 12.5 mg by mouth daily. Daily in AM    atorvastatin (LIPITOR) 10 mg tablet Take 10 mg by mouth every evening. Daily in PM    losartan (COZAAR) 50 mg tablet Take 50 mg by mouth every evening. Daily in PM    montelukast (SINGULAIR) 10 mg tablet Take 10 mg by mouth every evening. Daily in PM    cetirizine (ZYRTEC) 10 mg tablet Take 10 mg by mouth daily. Daily in AM    ALBUTEROL SULFATE IN Take  by inhalation as needed. 0.5mg/3mg per 3mL     Allergies   Allergen Reactions    Amoxicillin Angioedema    Pcn [Penicillins] Angioedema    Phenergan [Promethazine] Angioedema        Review of Systems:  A comprehensive review of systems was negative except for that written in the HPI. Objective:     Patient Vitals for the past 8 hrs:   BP Temp Pulse Resp SpO2 Height Weight   23 1402 (!) 151/101 -- (!) 114 -- 99 % -- --   23 1205 -- -- (!) 115 -- 97 % -- --   23 1044 (!) 147/84 99.2 °F (37.3 °C) (!) 128 18 99 % 5' 2\" (1.575 m) 49 kg (108 lb)     Temp (24hrs), Av.2 °F (37.3 °C), Min:99.2 °F (37.3 °C), Max:99.2 °F (37.3 °C)    Physical Exam  Constitutional:       Appearance:  well-developed, well nourished  HENT:      Head: Normocephalic and atraumatic. Neck: soft collar in place, anterior cervical dressing in place, steri-strips intact, no surrounding erythema, no drainage. Mild edema, trachea midline. Able to swallow. Slight hoarseness with speech. Mouth: Mucous membranes are moist. Able to control secretions  Cardiovascular:      Good peripheral pulses  Pulmonary:      Effort: Pulmonary effort is normal. No respiratory distress.    Abdominal: General: non-distended. Musculoskeletal:  +FROM BUE/BLE, pain in left arm with AROM of shoulder F/E/ABD/ADD. Able to F/E at elbow with minimal discomfort. SILT, no numbness or paraesthesia.  strength equal 4+/5. Skin:     General: Skin is warm. Capillary Refill: Capillary refill takes less than 3 seconds. Neurological:      General: No focal deficit present. Mental Status: She is alert and oriented to person, place, and time. Data Review   Recent Results (from the past 24 hour(s))   SAMPLES BEING HELD    Collection Time: 01/22/23 10:50 AM   Result Value Ref Range    SAMPLES BEING HELD       1PST,1LAV,1BL,1SST,1DRK GRN, 2 BLOOD CULTURE BOTTLES, 1RED    COMMENT        Add-on orders for these samples will be processed based on acceptable specimen integrity and analyte stability, which may vary by analyte. METABOLIC PANEL, BASIC    Collection Time: 01/22/23 10:50 AM   Result Value Ref Range    Sodium 136 136 - 145 mmol/L    Potassium 4.2 3.5 - 5.1 mmol/L    Chloride 102 97 - 108 mmol/L    CO2 31 21 - 32 mmol/L    Anion gap 3 (L) 5 - 15 mmol/L    Glucose 99 65 - 100 mg/dL    BUN 12 6 - 20 MG/DL    Creatinine 0.71 0.55 - 1.02 MG/DL    BUN/Creatinine ratio 17 12 - 20      eGFR >60 >60 ml/min/1.73m2    Calcium 9.3 8.5 - 10.1 MG/DL   CBC WITH AUTOMATED DIFF    Collection Time: 01/22/23 10:50 AM   Result Value Ref Range    WBC 10.7 3.6 - 11.0 K/uL    RBC 4.72 3.80 - 5.20 M/uL    HGB 13.7 11.5 - 16.0 g/dL    HCT 41.3 35.0 - 47.0 %    MCV 87.5 80.0 - 99.0 FL    MCH 29.0 26.0 - 34.0 PG    MCHC 33.2 30.0 - 36.5 g/dL    RDW 12.7 11.5 - 14.5 %    PLATELET 103 529 - 805 K/uL    MPV 9.8 8.9 - 12.9 FL    NRBC 0.0 0  WBC    ABSOLUTE NRBC 0.00 0.00 - 0.01 K/uL    NEUTROPHILS 84 (H) 32 - 75 %    LYMPHOCYTES 10 (L) 12 - 49 %    MONOCYTES 5 5 - 13 %    EOSINOPHILS 1 0 - 7 %    BASOPHILS 0 0 - 1 %    IMMATURE GRANULOCYTES 0 0.0 - 0.5 %    ABS. NEUTROPHILS 8.8 (H) 1.8 - 8.0 K/UL    ABS.  LYMPHOCYTES 1.1 0.8 - 3.5 K/UL    ABS. MONOCYTES 0.6 0.0 - 1.0 K/UL    ABS. EOSINOPHILS 0.1 0.0 - 0.4 K/UL    ABS. BASOPHILS 0.0 0.0 - 0.1 K/UL    ABS. IMM. GRANS. 0.0 0.00 - 0.04 K/UL    DF AUTOMATED     COVID-19 RAPID TEST    Collection Time: 01/22/23 11:31 AM   Result Value Ref Range    Specimen source Nasopharyngeal      COVID-19 rapid test Not detected NOTD       CT NECK w/ CONT  INDICATION: post acdf with pain     COMPARISON: None     TECHNIQUE:  Axial neck CT was performed after the uneventful administration of  IV contrast. Sagittal and coronal reformats were generated. CT dose reduction was achieved through use of a standardized protocol tailored  for this examination and automatic exposure control for dose modulation. CONTRAST: 100 mL Isovue 370     FINDINGS:     NASOPHARYNX: Normal.  SUPRAHYOID NECK: Normal oropharynx, oral cavity, parapharyngeal space, and  retropharyngeal space. INFRAHYOID NECK: Normal larynx, hypopharynx and supraglottis. PAROTID GLANDS: Normal.  SUBMANDIBULAR GLANDS: Normal.  THYROID GLAND: Normal. No nodule. LYMPH NODES: None enlarged by CT size criteria . LUNG APICES: Clear. OSSEOUS STRUCTURES: Status post C5-C6 ACDF. Hardware appears intact. Alignment  of the cervical spine is anatomic. .  ADDITIONAL COMMENTS: Small amount of gas locules are seen in the lower left  neck. There is a 1.6 x 1.0 x 1.7 cm fluid collection just anterior/inferior to  the ACDF in the prevertebral space (series 3 image 76). There is thrombosis of  the left internal jugular vein, concerning for thrombophlebitis. IMPRESSION  1. Status post C5-C6 ACDF, with a small 1.6 x 1.7 cm fluid collection in the  prevertebral space just anterior/inferior to the ACDF, which may represent a  small postsurgical fluid versus abscess. 2.  Thrombosis of the left internal jugular vein, concerning for  thrombophlebitis.   3.  Small gas locules are present in the left lower neck, likely postsurgical.INDICATION: post acdf with pain     COMPARISON: None     TECHNIQUE:  Axial neck CT was performed after the uneventful administration of  IV contrast. Sagittal and coronal reformats were generated. CT dose reduction was achieved through use of a standardized protocol tailored  for this examination and automatic exposure control for dose modulation. CONTRAST: 100 mL Isovue 370     FINDINGS:     NASOPHARYNX: Normal.  SUPRAHYOID NECK: Normal oropharynx, oral cavity, parapharyngeal space, and  retropharyngeal space. INFRAHYOID NECK: Normal larynx, hypopharynx and supraglottis. PAROTID GLANDS: Normal.  SUBMANDIBULAR GLANDS: Normal.  THYROID GLAND: Normal. No nodule. LYMPH NODES: None enlarged by CT size criteria . LUNG APICES: Clear. OSSEOUS STRUCTURES: Status post C5-C6 ACDF. Hardware appears intact. Alignment  of the cervical spine is anatomic. .  ADDITIONAL COMMENTS: Small amount of gas locules are seen in the lower left  neck. There is a 1.6 x 1.0 x 1.7 cm fluid collection just anterior/inferior to  the ACDF in the prevertebral space (series 3 image 76). There is thrombosis of  the left internal jugular vein, concerning for thrombophlebitis. IMPRESSION  1. Status post C5-C6 ACDF, with a small 1.6 x 1.7 cm fluid collection in the  prevertebral space just anterior/inferior to the ACDF, which may represent a  small postsurgical fluid versus abscess. 2.  Thrombosis of the left internal jugular vein, concerning for  thrombophlebitis.   3.  Small gas locules are present in the left lower neck, likely postsurgical.    Assessment/Plan:     A:   POD#5 s/p ACDF of C5-6 with increased neck pain and fluid collection  Thrombosis of the left internal jugular with possible thrombophlebitis  Neck pain with hoarseness    P:  Admitted to medicine service and placement in ICU setting - close observation for any decline, increasing HA, SOB, changes in edema, unable to swallow, unable to control secretions, febrile  No surgical indication for I&D at this time. WBC's 10.7. temp 99.2. Continue to monitor closely  Vascular consulted for left IJ thrombus  Orthopedic elective team to continue to follow tomorrow.      I have discussed above findings and plan of care with Dr. Jami Zepeda.   Above case and findings have been discussed with Dr. Jennings Sessions by Dr. Caroline Vivar PA-C  Orthopaedic Surgery [address]

## 2023-01-22 NOTE — ED PROVIDER NOTES
This is a 42-year-old female who presents to the emergency room with complaints of worsening neck pain post ACDF surgery by Dr. Lizzette Serna on Tuesday. Patient, according to chart review, had an uneventful stay in the hospital. States yesterday started to develop a cough and states \"when I coughed so hard my neck started to hurt. \"Unable to control pain since  that  time. Denies any chest pain, shortness of breath, dizziness, nausea or vomiting, fevers or chills. Is tachycardic in triage with a heart rate of 128. Appears uncomfortable. There are no further complaints at this time. Surgeon: Dr. Lizzette Serna         Past Medical History:   Diagnosis Date    Adverse effect of anesthesia     \"hard time waking up after tubal liogation\"    Asthma     Hyperlipidemia     Hypertension        Past Surgical History:   Procedure Laterality Date    HX COLONOSCOPY      HX GYN  2016    tubal ligation    HX ORTHOPAEDIC Left 2010    foot sx x2 same year    HX ORTHOPAEDIC Left 2010    knee cyst removal         No family history on file.     Social History     Socioeconomic History    Marital status: SINGLE     Spouse name: Not on file    Number of children: Not on file    Years of education: Not on file    Highest education level: Not on file   Occupational History    Not on file   Tobacco Use    Smoking status: Never    Smokeless tobacco: Never   Substance and Sexual Activity    Alcohol use: Not Currently    Drug use: Never    Sexual activity: Not on file   Other Topics Concern    Not on file   Social History Narrative    Not on file     Social Determinants of Health     Financial Resource Strain: Not on file   Food Insecurity: Not on file   Transportation Needs: Not on file   Physical Activity: Not on file   Stress: Not on file   Social Connections: Not on file   Intimate Partner Violence: Not on file   Housing Stability: Not on file         ALLERGIES: Amoxicillin, Pcn [penicillins], and Phenergan [promethazine]    Review of Systems   Constitutional:  Negative for activity change, appetite change, diaphoresis, fatigue and fever. HENT:  Positive for voice change (mildly hoarse). Negative for congestion, ear discharge, ear pain, sinus pressure, sinus pain, sore throat and trouble swallowing. Eyes:  Negative for pain, redness, itching and visual disturbance. Respiratory:  Positive for cough. Negative for shortness of breath. Cardiovascular:  Negative for chest pain and palpitations. Gastrointestinal:  Negative for abdominal distention, abdominal pain, nausea and vomiting. Musculoskeletal:  Positive for neck pain and neck stiffness (in a soft collar). Skin:  Positive for wound (left anterior cervical disc fusion wound with dressing, clean, dry and intact. ). Negative for color change, pallor and rash. Neurological:  Negative for facial asymmetry, speech difficulty and weakness. Hematological:  Does not bruise/bleed easily. Psychiatric/Behavioral:  Negative for dysphoric mood. The patient is not nervous/anxious. Vitals:    01/22/23 1044   BP: (!) 147/84   Pulse: (!) 128   Resp: 18   Temp: 99.2 °F (37.3 °C)   SpO2: 99%   Weight: 49 kg (108 lb)   Height: 5' 2\" (1.575 m)            Physical Exam  Vitals and nursing note reviewed. Constitutional:       Appearance: Normal appearance. She is not ill-appearing. HENT:      Head: Normocephalic and atraumatic. Right Ear: External ear normal.      Left Ear: External ear normal.      Nose: Nose normal. No congestion. Mouth/Throat:      Mouth: Mucous membranes are moist.   Eyes:      General:         Right eye: No discharge. Left eye: No discharge. Conjunctiva/sclera: Conjunctivae normal.      Pupils: Pupils are equal, round, and reactive to light. Neck:      Comments: Neck with surgical wound, dressing clean, dry and intact. In a soft cervical collar. ROM limited due to cervical collar and post operative instructions.   Cardiovascular:      Rate and Rhythm: Tachycardia present. Heart sounds: Normal heart sounds. No murmur heard. Pulmonary:      Effort: Pulmonary effort is normal.      Breath sounds: Normal breath sounds. Comments: Non productive cough    Abdominal:      General: Bowel sounds are normal. There is no distension. Palpations: Abdomen is soft. Tenderness: There is no guarding. Genitourinary:     Comments: Negative    Musculoskeletal:         General: No tenderness. Normal range of motion. Cervical back: Tenderness: at operative site. .   Skin:     General: Skin is warm and dry. Capillary Refill: Capillary refill takes less than 2 seconds. Neurological:      General: No focal deficit present. Mental Status: She is alert and oriented to person, place, and time. Motor: No weakness. Coordination: Coordination normal.   Psychiatric:         Mood and Affect: Mood normal.         Behavior: Behavior normal.         Thought Content: Thought content normal.         Judgment: Judgment normal.        Medical Decision Making  Differential diagnosis includes postoperative pain, COVID-19, pneumonia and others. This is a 43-year-old female who presents to the emergency room with complaints of worsening pain in her neck status post ACDF on Tuesday. This was performed by Dr. Ariella Nieves. After initial assessment the following was completed:    1. Previous notes (external to Emergency room) were reviewed: chart was reviewed, operative notes and inpatient notes. 2.History was obtained by patient    3. Chronic medical issues affecting this visit:  Past Medical History:  No date:  Adverse effect of anesthesia      Comment:  \"hard time waking up after tubal liogation\"  No date: Asthma  No date: Hyperlipidemia  No date: Hypertension  Past Surgical History:  No date: HX COLONOSCOPY  2016: HX GYN      Comment:  tubal ligation  2010: HX ORTHOPAEDIC; Left      Comment:  foot sx x2 same year  2010: HX ORTHOPAEDIC; Left Comment:  knee cyst removal    3. I ordered the following tests, interpreted them independently followed by review of final reads by lab and radiology: CBC, BMP, Covid, CT neck, CXR    4. I considered ordering blood cultures    5. Any intervention/ surgery needed: After physical assessment and review of laboratory data and imaging, patient was admitted to the hospitalist service with orthopedic and vascular consult. Discussed with Dr. Teagan Bazan who saw patient and is in agreement with plan of care. Patient in agreement with plan of care. 6. Social determinants of health none    7 Final diagnosis post operative uncontrolled pain, left internal jugular thrombus, tachycardia, . Medications prescribed Morphine and dialudid. Awaiting vascular input regarding anticoagulation. 8. Disposition Admit to the hospital.      Problems Addressed:  Neck pain: acute illness or injury  Tachycardia: acute illness or injury  Thrombus: acute illness or injury    Amount and/or Complexity of Data Reviewed  External Data Reviewed: radiology and notes. Labs: ordered. Radiology: ordered. ECG/medicine tests: ordered. Risk  Prescription drug management. Decision regarding hospitalization. Critical Care  Total time providing critical care: < 30 minutes    ED Course as of 01/22/23 1106   Sun Jan 22, 2023   1103 ED EKG interpretation:  Rhythm: sinus tach. Rate (approx.): 114. Axis: normal.  ST segment:  No concerning ST elevations or depressions. This EKG was interpreted by Jori Carmona MD,ED Provider. [JM]      ED Course User Index  [JM] Presley Villasenor MD       Perfect Serve Consult for Admission  1:35 PM    ED Room Number: ERA/A  Patient Name and age: Job Sam 52 y.o.  female  Working Diagnosis:   1. Tachycardia    2. Neck pain    3.  Thrombus        COVID-19 Suspicion:  no  Sepsis present:  no  Reassessment needed: no  Code Status:  Full Code  Readmission: no  Isolation Requirements:  no  Recommended Level of Care:  telemetry  Department:[facility] ED - [phone]  Other: 80-year-old female status post ACDF by Dr. Jona Quinn on Tuesday returns with acute pain that has not been able to be controlled. On CT scan found to have a left IJ thrombus. Pain not controlled in the emergency room. Orthopedics in to see patient and in agreement with admission to hospitalist service. Awaiting vascular consult.   Procedures

## 2023-01-22 NOTE — ED NOTES
Ultrasound IV by ED Staff Procedure Note    Patient meets criteria for US IV insertion. Ultrasound IV verbal education provided to patient. Opportunities for questions given. IV ultrasound technique used for PIV placement:  20 gauge Arrow Endurance Extended Dwell(may stay in place for 29 days)  L Basilic location. 1 X Attempt(s). Procedure tolerated well. Primary RN aware of IV placement and added to LDA.                                 Laura Vogt

## 2023-01-22 NOTE — H&P
[facility]  [address], [facility] [address]  [phone]         Hospitalist Admission Note        NAME:  Aden Hawkins   :  1975   MRN:  [id number]    Date/Time:  2023     Patient PCP:  Vicente Anne MD    Emergency Contact:    Extended Emergency Contact Information  Primary Emergency Contact: [address]  Mobile Phone: [phone]  Relation: Mother      Code: Full Code     Isolation Precautions: There are currently no Active Isolations        Subjective:     Chief Complaint: Neck pain and left internal jugular thrombosis    History of Present Illness:     Ms. Matt De Guzman is a 52 y.o. female with history that includes asthma, HLD, HTN, and who suffered a fall resulting in a ACDF done on 23 presents with severe cervical pain. She was DC home on Thursday and cought last night started having constant cervical pain that progressed with severe prompting her to come to ER. She thinks the pain was initiated or at least exacerbated by coughing as she was recovering from acute bronchitis prior to surgery. Pain not a/w neurologic deficits. In ER she CT of neck and soft tissue showed small post surgical fluid collection in prevertebral space adjacent to ACDF but also showed left IJ thrombosis that together with the pain prompted admission to ICU. She was given morphine IV initially which didn't help but dilaudid IV helped. Percocet makes her vomit. Allergies  Allergies   Allergen Reactions    Amoxicillin Angioedema    Pcn [Penicillins] Angioedema    Phenergan [Promethazine] Angioedema       Medications  Prior to Admission medications    Medication Sig Start Date End Date Taking? Authorizing Provider   cyclobenzaprine (FLEXERIL) 10 mg tablet Take 1 Tablet by mouth three (3) times daily as needed for Muscle Spasm(s).  23   Mindi Thompson NP   ondansetron (ZOFRAN ODT) 4 mg disintegrating tablet Take 1 Tablet by mouth every six (6) hours as needed for Nausea or Vomiting. 1/19/23   Shavon Elkins NP   HYDROcodone-acetaminophen [facility]) 5-325 mg per tablet Take 1-2 Tablets by mouth every four (4) hours as needed for Pain for up to 7 days. Max Daily Amount: 12 Tablets. 1/19/23 1/26/23  Shavon Elkins NP   docusate sodium (COLACE) 100 mg capsule Take 1 Capsule by mouth two (2) times a day for 30 days. Indications: constipation 1/19/23 2/18/23  Shavon Elkins NP   fluticasone propionate (FLONASE) 50 mcg/actuation nasal spray 2 Sprays by Both Nostrils route two (2) times a day. 1/19/23   Shavon Elkins NP   azelastine HCl (AZELASTINE NA) by Nasal route daily. Daily in AM    Provider, Historical   omeprazole (PRILOSEC) 40 mg capsule Take 40 mg by mouth daily. Daily in AM    Provider, Historical   hydroCHLOROthiazide (HYDRODIURIL) 12.5 mg tablet Take 12.5 mg by mouth daily. Daily in AM    Provider, Historical   atorvastatin (LIPITOR) 10 mg tablet Take 10 mg by mouth every evening. Daily in PM    Provider, Historical   losartan (COZAAR) 50 mg tablet Take 50 mg by mouth every evening. Daily in PM    Provider, Historical   montelukast (SINGULAIR) 10 mg tablet Take 10 mg by mouth every evening. Daily in PM    Provider, Historical   cetirizine (ZYRTEC) 10 mg tablet Take 10 mg by mouth daily. Daily in AM    Provider, Historical   ALBUTEROL SULFATE IN Take  by inhalation as needed.  0.5mg/3mg per 3mL    Provider, Historical        Past Medical History  Past Medical History:   Diagnosis Date    Adverse effect of anesthesia     \"hard time waking up after tubal liogation\"    Asthma     Hyperlipidemia     Hypertension         Past Surgical History  Past Surgical History:   Procedure Laterality Date    HX CERVICAL FUSION  01/17/2023    HX COLONOSCOPY      HX GYN  2016    tubal ligation    HX ORTHOPAEDIC Left 2010    foot sx x2 same year    HX ORTHOPAEDIC Left 2010    knee cyst removal       Social History  Social History     Tobacco Use    Smoking status: Never Smokeless tobacco: Never   Substance Use Topics    Alcohol use: Not Currently        Family History  Family History   Problem Relation Age of Onset    Asthma Mother     Hypertension Mother     Diabetes Mother           Review of Systems (14 point ROS):  Gen:   Negative, malaise, denies chills, and denies fevers  Eyes:  negative  ENT:    negative  Resp:  cough and wheezing  CVS:   negative  GI:       negative  MS:      Neck pain  Tonia:    negative     Remainder of 14 point ROS was reviewed and was negative         Objective:      Visit Vitals  BP (!) 151/96   Pulse (!) 107   Temp 99.2 °F (37.3 °C)   Resp 12   Ht 5' 2\" (1.575 m)   Wt 49 kg (108 lb)   SpO2 100%   BMI 19.75 kg/m²       Physical Exam:  General: alert, well appearing, and no distress  Head: Normocephalic, without obvious abnormality, atraumatic  Eyes: PERRL, EOMI, anicteric sclerae, and conjuntiva clear  ENT: lips, mucosa, and tongue normal  Neck:  In cervical collar  Lungs: clear to auscultation with good breath sounds and normal respiratory effort and cough  Heart: S1, S2 normal, tachycardia, and regular rhythm  Abd: not distended, soft, nontender, BS present and normactive  Ext: no cyanosis and no edema  Skin: normal skin color, no rashes, and texture normal  Neuro:  alert, oriented x 3, no defects noted in general exam.  Psych: not anxious, cooperative, appropriate affect      Labs:  Recent Labs     01/22/23  1050   WBC 10.7   HGB 13.7   HCT 41.3        Recent Labs     01/22/23  1050      K 4.2      CO2 31   GLU 99   BUN 12   CREA 0.71   CA 9.3       Radiology:  XR CHEST PA LAT    Result Date: 1/22/2023  No acute abnormality. CT NECK SOFT TISSUE W CONT    Result Date: 1/22/2023  1. Status post C5-C6 ACDF, with a small 1.6 x 1.7 cm fluid collection in the prevertebral space just anterior/inferior to the ACDF, which may represent a small postsurgical fluid versus abscess.  2.  Thrombosis of the left internal jugular vein, concerning for thrombophlebitis.  3.  Small gas locules are present in the left lower neck, likely postsurgical.   I reviewed the interpretation of imaging studies      [facility], labs, imaging studies, and medications was reviewed by me on: January 22, 2023         Assessment/Plan:      Left IJ thrombosis following ACDF:  Vascular recommends heparin followed by [address] x 3 months  Admit pt to ICU and start IV heparin bolus and drip per protocol  ICU order set and GI prophylaxis  Monitor labs closely     Cervicalgia s/p ACDF C5-6 on 1/17/23:  Triggered by coughing  Continue C-collar  Pain control with IV dilaudid  Ortho consulted recommends: \"placement in ICU setting - close observation for any decline, increasing HA, SOB, changes in edema, unable to swallow, unable to control secretions, febrile\"     Recent Bronchitis with residual cough and wheezing in pt with h/o asthma:  Goal to minimize cough and SOB  Scheduled and prn bronchodilators along with pulmicort  Tessalon     Body mass index is 19.75 kg/m².:  18.5 - 24.9:  Normal weight     F: LR 75 ml/hr  E: Stable and Monitor  N: ADULT DIET Regular         Risk of deterioration: high            Discussed:  Pt's condition, Imaging findings, Lab findings, Assessment, Care Plan, and Code Status discussed with: Patient, Family at bedsude, ED Provider, and RN     Prophylaxis:  Heparin drip     Anticipated discharge disposition:  Home with family     HR DC Barriers: Currently not medically stable for discharge        Total critical care time was 60 minutes in direct patient care with this critically ill patient.  (1st 30-74min: [id number])                                                                                                 ___________________________________________________     Admitting Physician:   Sydell Aschoff, MD            CC: Other, MD [place]

## 2023-01-22 NOTE — H&P
[facility]  [address], [facility] [address]  [phone]         Hospitalist Admission Note        NAME:  Laura Yanez   :  1975   MRN:  [id number]    Date/Time:  2023     Patient PCP:  Nisha Beatty MD    Emergency Contact:    Extended Emergency Contact Information  Primary Emergency Contact: [address]  Mobile Phone: [phone]  Relation: Mother      Code: Full Code     Isolation Precautions: There are currently no Active Isolations        Subjective:     Chief Complaint: Neck pain and left internal jugular thrombosis    History of Present Illness:     Ms. Mary Roe is a 52 y.o. female with history that includes asthma, HLD, HTN, and who suffered a fall resulting in a ACDF C5-6 done on 23 presents with severe cervical pain. She was DC home on Thursday and cought last night started having constant cervical pain that progressed with severe prompting her to come to ER. She thinks the pain was initiated or at least exacerbated by coughing as she was recovering from acute bronchitis prior to surgery. Pain not a/w neurologic deficits. In ER she CT of neck and soft tissue showed small post surgical fluid collection in prevertebral space adjacent to ACDF but also showed left IJ thrombosis that together with the pain prompted admission to ICU. She was given morphine IV initially which didn't help but dilaudid IV helped. Percocet makes her vomit. Allergies  Allergies   Allergen Reactions    Amoxicillin Angioedema    Pcn [Penicillins] Angioedema    Phenergan [Promethazine] Angioedema       Medications  Prior to Admission medications    Medication Sig Start Date End Date Taking? Authorizing Provider   cyclobenzaprine (FLEXERIL) 10 mg tablet Take 1 Tablet by mouth three (3) times daily as needed for Muscle Spasm(s).  23   Shaunna Mcardle, NP   ondansetron (ZOFRAN ODT) 4 mg disintegrating tablet Take 1 Tablet by mouth every six (6) hours as needed for Nausea or Vomiting. 1/19/23   Brii Sotomayor, YORDAN   HYDROcodone-acetaminophen [facility]) 5-325 mg per tablet Take 1-2 Tablets by mouth every four (4) hours as needed for Pain for up to 7 days. Max Daily Amount: 12 Tablets. 1/19/23 1/26/23  Brii Sotomayor NP   docusate sodium (COLACE) 100 mg capsule Take 1 Capsule by mouth two (2) times a day for 30 days. Indications: constipation 1/19/23 2/18/23  Brii Sotomayor NP   fluticasone propionate (FLONASE) 50 mcg/actuation nasal spray 2 Sprays by Both Nostrils route two (2) times a day. 1/19/23   Brii Sotomayor, YORDAN   azelastine HCl (AZELASTINE NA) by Nasal route daily. Daily in AM    Provider, Historical   omeprazole (PRILOSEC) 40 mg capsule Take 40 mg by mouth daily. Daily in AM    Provider, Historical   hydroCHLOROthiazide (HYDRODIURIL) 12.5 mg tablet Take 12.5 mg by mouth daily. Daily in AM    Provider, Historical   atorvastatin (LIPITOR) 10 mg tablet Take 10 mg by mouth every evening. Daily in PM    Provider, Historical   losartan (COZAAR) 50 mg tablet Take 50 mg by mouth every evening. Daily in PM    Provider, Historical   montelukast (SINGULAIR) 10 mg tablet Take 10 mg by mouth every evening. Daily in PM    Provider, Historical   cetirizine (ZYRTEC) 10 mg tablet Take 10 mg by mouth daily. Daily in AM    Provider, Historical   ALBUTEROL SULFATE IN Take  by inhalation as needed.  0.5mg/3mg per 3mL    Provider, Historical        Past Medical History  Past Medical History:   Diagnosis Date    Adverse effect of anesthesia     \"hard time waking up after tubal liogation\"    Asthma     Hyperlipidemia     Hypertension         Past Surgical History  Past Surgical History:   Procedure Laterality Date    HX CERVICAL FUSION  01/17/2023    HX COLONOSCOPY      HX GYN  2016    tubal ligation    HX ORTHOPAEDIC Left 2010    foot sx x2 same year    HX ORTHOPAEDIC Left 2010    knee cyst removal       Social History  Social History     Tobacco Use    Smoking status: Never Smokeless tobacco: Never   Substance Use Topics    Alcohol use: Not Currently        Family History  Family History   Problem Relation Age of Onset    Asthma Mother     Hypertension Mother     Diabetes Mother           Review of Systems (14 point ROS):  Gen:   Negative, malaise, denies chills, and denies fevers  Eyes:  negative  ENT:    negative  Resp:  cough and wheezing  CVS:   negative  GI:       negative  MS:      Neck pain  Tonia:    negative     Remainder of 14 point ROS was reviewed and was negative         Objective:      Visit Vitals  BP (!) 154/92   Pulse (!) 110   Temp 99.2 °F (37.3 °C)   Resp 16   Ht 5' 2\" (1.575 m)   Wt 49 kg (108 lb)   SpO2 98%   BMI 19.75 kg/m²       Physical Exam:  General: alert, well appearing, and no distress  Head: Normocephalic, without obvious abnormality, atraumatic  Eyes: PERRL, EOMI, anicteric sclerae, and conjuntiva clear  ENT: lips, mucosa, and tongue normal  Neck:  In cervical collar  Lungs: clear to auscultation with good breath sounds and normal respiratory effort and cough  Heart: S1, S2 normal, tachycardia, and regular rhythm  Abd: not distended, soft, nontender, BS present and normactive  Ext: no cyanosis and no edema  Skin: normal skin color, no rashes, and texture normal  Neuro:  alert, oriented x 3, no defects noted in general exam.  Psych: not anxious, cooperative, appropriate affect      Labs:  Recent Labs     01/22/23  1630   WBC 9.2   HGB 12.6   HCT 38.0          Recent Labs     01/22/23  1050      K 4.2      CO2 31   GLU 99   BUN 12   CREA 0.71   CA 9.3         Radiology:  XR CHEST PA LAT    Result Date: 1/22/2023  No acute abnormality. CT NECK SOFT TISSUE W CONT    Result Date: 1/22/2023  1. Status post C5-C6 ACDF, with a small 1.6 x 1.7 cm fluid collection in the prevertebral space just anterior/inferior to the ACDF, which may represent a small postsurgical fluid versus abscess.  2.  Thrombosis of the left internal jugular vein, concerning for thrombophlebitis.  3.  Small gas locules are present in the left lower neck, likely postsurgical.   I reviewed the interpretation of imaging studies      [facility], labs, imaging studies, medications, and consultants notes was reviewed by me on: January 22, 2023         Assessment/Plan:      Left IJ thrombosis following ACDF:  Vascular recommends heparin followed by [address] [place] [address] x 3 months  Admit pt to ICU and start IV heparin bolus and drip per protocol  ICU order set and GI prophylaxis  Monitor labs closely    Cervicalgia s/p ACDF C5-6 on 1/17/23:  Triggered by coughing  Continue C-collar  Pain control with IV dilaudid  Ortho consulted recommends: \"placement in ICU setting - close observation for any decline, increasing HA, SOB, changes in edema, unable to swallow, unable to control secretions, febrile\"    Recent Bronchitis with residual cough and wheezing in pt with h/o asthma:  Goal to minimize cough and SOB  Scheduled and prn bronchodilators along with pulmicort  Tessalon     Body mass index is 19.75 kg/m².:  18.5 - 24.9:  Normal weight    F: LR 75 ml/hr  E: Stable and Monitor  N: ADULT DIET Regular       Risk of deterioration: high      Discussed:  Pt's condition, Imaging findings, Lab findings, Assessment, Care Plan, and Code Status discussed with: Patient, Family at bedsude, ED Provider, and RN    Prophylaxis:  Heparin drip    Anticipated discharge disposition:  Home with family    HR DC Barriers: Currently not medically stable for discharge      Total critical care time was 60 minutes in direct patient care with this critically ill patient.  (1st 30-74min: [id number])                 ___________________________________________________    Admitting Physician: Sarina Romero MD         CC: Other, MD [place]

## 2023-01-23 LAB
ALBUMIN SERPL-MCNC: 2.7 G/DL (ref 3.5–5)
ALBUMIN/GLOB SERPL: 0.7 (ref 1.1–2.2)
ALP SERPL-CCNC: 49 U/L (ref 45–117)
ALT SERPL-CCNC: 13 U/L (ref 12–78)
ANION GAP SERPL CALC-SCNC: 4 MMOL/L (ref 5–15)
AST SERPL-CCNC: 10 U/L (ref 15–37)
BASOPHILS # BLD: 0 K/UL (ref 0–0.1)
BASOPHILS NFR BLD: 0 % (ref 0–1)
BILIRUB SERPL-MCNC: 0.9 MG/DL (ref 0.2–1)
BUN SERPL-MCNC: 9 MG/DL (ref 6–20)
BUN/CREAT SERPL: 21 (ref 12–20)
CALCIUM SERPL-MCNC: 8.3 MG/DL (ref 8.5–10.1)
CHLORIDE SERPL-SCNC: 105 MMOL/L (ref 97–108)
CO2 SERPL-SCNC: 28 MMOL/L (ref 21–32)
CREAT SERPL-MCNC: 0.43 MG/DL (ref 0.55–1.02)
DIFFERENTIAL METHOD BLD: ABNORMAL
EOSINOPHIL # BLD: 0.1 K/UL (ref 0–0.4)
EOSINOPHIL NFR BLD: 1 % (ref 0–7)
ERYTHROCYTE [DISTWIDTH] IN BLOOD BY AUTOMATED COUNT: 12.5 % (ref 11.5–14.5)
GLOBULIN SER CALC-MCNC: 3.7 G/DL (ref 2–4)
GLUCOSE SERPL-MCNC: 95 MG/DL (ref 65–100)
HCT VFR BLD AUTO: 33.9 % (ref 35–47)
HGB BLD-MCNC: 11.1 G/DL (ref 11.5–16)
IMM GRANULOCYTES # BLD AUTO: 0 K/UL (ref 0–0.04)
IMM GRANULOCYTES NFR BLD AUTO: 0 % (ref 0–0.5)
INR PPP: 1 (ref 0.9–1.1)
LACTATE SERPL-SCNC: 0.7 MMOL/L (ref 0.4–2)
LYMPHOCYTES # BLD: 1.3 K/UL (ref 0.8–3.5)
LYMPHOCYTES NFR BLD: 14 % (ref 12–49)
MAGNESIUM SERPL-MCNC: 2 MG/DL (ref 1.6–2.4)
MCH RBC QN AUTO: 28.4 PG (ref 26–34)
MCHC RBC AUTO-ENTMCNC: 32.7 G/DL (ref 30–36.5)
MCV RBC AUTO: 86.7 FL (ref 80–99)
MONOCYTES # BLD: 0.7 K/UL (ref 0–1)
MONOCYTES NFR BLD: 8 % (ref 5–13)
NEUTS SEG # BLD: 6.6 K/UL (ref 1.8–8)
NEUTS SEG NFR BLD: 77 % (ref 32–75)
NRBC # BLD: 0 K/UL (ref 0–0.01)
NRBC BLD-RTO: 0 PER 100 WBC
PHOSPHATE SERPL-MCNC: 2.7 MG/DL (ref 2.6–4.7)
PLATELET # BLD AUTO: 228 K/UL (ref 150–400)
PMV BLD AUTO: 9.5 FL (ref 8.9–12.9)
POTASSIUM SERPL-SCNC: 3.7 MMOL/L (ref 3.5–5.1)
PROT SERPL-MCNC: 6.4 G/DL (ref 6.4–8.2)
PROTHROMBIN TIME: 10.9 SEC (ref 9–11.1)
RBC # BLD AUTO: 3.91 M/UL (ref 3.8–5.2)
SODIUM SERPL-SCNC: 137 MMOL/L (ref 136–145)
UFH PPP CHRO-ACNC: 0.54 IU/ML
UFH PPP CHRO-ACNC: 0.57 IU/ML
WBC # BLD AUTO: 8.7 K/UL (ref 3.6–11)

## 2023-01-23 PROCEDURE — 84100 ASSAY OF PHOSPHORUS: CPT

## 2023-01-23 PROCEDURE — 85025 COMPLETE CBC W/AUTO DIFF WBC: CPT

## 2023-01-23 PROCEDURE — 74011250636 HC RX REV CODE- 250/636: Performed by: HOSPITALIST

## 2023-01-23 PROCEDURE — 83735 ASSAY OF MAGNESIUM: CPT

## 2023-01-23 PROCEDURE — 77030029684 HC NEB SM VOL KT MONA -A

## 2023-01-23 PROCEDURE — 36415 COLL VENOUS BLD VENIPUNCTURE: CPT

## 2023-01-23 PROCEDURE — 94664 DEMO&/EVAL PT USE INHALER: CPT

## 2023-01-23 PROCEDURE — 80053 COMPREHEN METABOLIC PANEL: CPT

## 2023-01-23 PROCEDURE — 94640 AIRWAY INHALATION TREATMENT: CPT

## 2023-01-23 PROCEDURE — 85520 HEPARIN ASSAY: CPT

## 2023-01-23 PROCEDURE — 65270000046 HC RM TELEMETRY

## 2023-01-23 PROCEDURE — 74011000250 HC RX REV CODE- 250: Performed by: HOSPITALIST

## 2023-01-23 PROCEDURE — 83605 ASSAY OF LACTIC ACID: CPT

## 2023-01-23 PROCEDURE — 74011250637 HC RX REV CODE- 250/637: Performed by: INTERNAL MEDICINE

## 2023-01-23 PROCEDURE — 74011250637 HC RX REV CODE- 250/637: Performed by: HOSPITALIST

## 2023-01-23 PROCEDURE — 74011250637 HC RX REV CODE- 250/637: Performed by: PHYSICIAN ASSISTANT

## 2023-01-23 PROCEDURE — 85610 PROTHROMBIN TIME: CPT

## 2023-01-23 RX ORDER — HYDROMORPHONE HYDROCHLORIDE 2 MG/1
4 TABLET ORAL
Status: DISCONTINUED | OUTPATIENT
Start: 2023-01-23 | End: 2023-01-26 | Stop reason: HOSPADM

## 2023-01-23 RX ORDER — SCOLOPAMINE TRANSDERMAL SYSTEM 1 MG/1
1 PATCH, EXTENDED RELEASE TRANSDERMAL
Status: DISCONTINUED | OUTPATIENT
Start: 2023-01-23 | End: 2023-01-26 | Stop reason: HOSPADM

## 2023-01-23 RX ORDER — FAMOTIDINE 20 MG/1
20 TABLET, FILM COATED ORAL EVERY 12 HOURS
Status: DISCONTINUED | OUTPATIENT
Start: 2023-01-23 | End: 2023-01-26 | Stop reason: HOSPADM

## 2023-01-23 RX ORDER — HYDROMORPHONE HYDROCHLORIDE 2 MG/1
2 TABLET ORAL
Status: DISCONTINUED | OUTPATIENT
Start: 2023-01-23 | End: 2023-01-26 | Stop reason: HOSPADM

## 2023-01-23 RX ORDER — CYCLOBENZAPRINE HCL 10 MG
10 TABLET ORAL
Status: DISCONTINUED | OUTPATIENT
Start: 2023-01-23 | End: 2023-01-26 | Stop reason: HOSPADM

## 2023-01-23 RX ORDER — HYDROMORPHONE HYDROCHLORIDE 1 MG/ML
0.5 INJECTION, SOLUTION INTRAMUSCULAR; INTRAVENOUS; SUBCUTANEOUS
Status: DISCONTINUED | OUTPATIENT
Start: 2023-01-23 | End: 2023-01-26 | Stop reason: HOSPADM

## 2023-01-23 RX ADMIN — HYDROMORPHONE HYDROCHLORIDE 1 MG: 1 INJECTION, SOLUTION INTRAMUSCULAR; INTRAVENOUS; SUBCUTANEOUS at 08:41

## 2023-01-23 RX ADMIN — HYDROMORPHONE HYDROCHLORIDE 1 MG: 1 INJECTION, SOLUTION INTRAMUSCULAR; INTRAVENOUS; SUBCUTANEOUS at 01:57

## 2023-01-23 RX ADMIN — FAMOTIDINE 20 MG: 20 TABLET, FILM COATED ORAL at 21:15

## 2023-01-23 RX ADMIN — LOSARTAN POTASSIUM 50 MG: 50 TABLET, FILM COATED ORAL at 17:26

## 2023-01-23 RX ADMIN — IPRATROPIUM BROMIDE AND ALBUTEROL SULFATE 3 ML: 2.5; .5 SOLUTION RESPIRATORY (INHALATION) at 20:10

## 2023-01-23 RX ADMIN — BENZONATATE 100 MG: 100 CAPSULE ORAL at 21:15

## 2023-01-23 RX ADMIN — BUDESONIDE 500 MCG: 0.5 INHALANT RESPIRATORY (INHALATION) at 20:14

## 2023-01-23 RX ADMIN — SODIUM CHLORIDE, POTASSIUM CHLORIDE, SODIUM LACTATE AND CALCIUM CHLORIDE 75 ML/HR: 600; 310; 30; 20 INJECTION, SOLUTION INTRAVENOUS at 07:07

## 2023-01-23 RX ADMIN — ATORVASTATIN CALCIUM 10 MG: 20 TABLET, FILM COATED ORAL at 17:26

## 2023-01-23 RX ADMIN — Medication 10 ML: at 14:16

## 2023-01-23 RX ADMIN — CYCLOBENZAPRINE 10 MG: 10 TABLET, FILM COATED ORAL at 08:44

## 2023-01-23 RX ADMIN — HYDROMORPHONE HYDROCHLORIDE 4 MG: 2 TABLET ORAL at 21:15

## 2023-01-23 RX ADMIN — ONDANSETRON 4 MG: 2 INJECTION INTRAMUSCULAR; INTRAVENOUS at 09:48

## 2023-01-23 RX ADMIN — Medication 10 ML: at 21:15

## 2023-01-23 RX ADMIN — HYDROMORPHONE HYDROCHLORIDE 4 MG: 2 TABLET ORAL at 17:26

## 2023-01-23 RX ADMIN — HYDROMORPHONE HYDROCHLORIDE 0.2 MG: 1 INJECTION, SOLUTION INTRAMUSCULAR; INTRAVENOUS; SUBCUTANEOUS at 00:22

## 2023-01-23 RX ADMIN — FAMOTIDINE 20 MG: 10 INJECTION, SOLUTION INTRAVENOUS at 08:41

## 2023-01-23 RX ADMIN — HEPARIN SODIUM 18 UNITS/KG/HR: 10000 INJECTION, SOLUTION INTRAVENOUS at 22:56

## 2023-01-23 RX ADMIN — Medication 10 ML: at 05:35

## 2023-01-23 RX ADMIN — HYDROMORPHONE HYDROCHLORIDE 1 MG: 1 INJECTION, SOLUTION INTRAMUSCULAR; INTRAVENOUS; SUBCUTANEOUS at 05:32

## 2023-01-23 RX ADMIN — BENZONATATE 100 MG: 100 CAPSULE ORAL at 08:41

## 2023-01-23 RX ADMIN — IPRATROPIUM BROMIDE AND ALBUTEROL SULFATE 3 ML: 2.5; .5 SOLUTION RESPIRATORY (INHALATION) at 14:13

## 2023-01-23 RX ADMIN — BENZONATATE 100 MG: 100 CAPSULE ORAL at 15:44

## 2023-01-23 RX ADMIN — HYDROMORPHONE HYDROCHLORIDE 2 MG: 2 TABLET ORAL at 14:16

## 2023-01-23 NOTE — PROGRESS NOTES
Patient seen and examined  Nothing required from a vascular standpoint. Ok to anticoagulate for 3 mo if ok by orthopaedics   No follow up required.

## 2023-01-23 NOTE — PROGRESS NOTES
Boby Robison [facility] [address]  [address] [facility], [address] [place] [address]  [phone]      Hospitalist Progress Note      NAME: Aden Hawkins   :  1975  MRM:  [id number]    Date/Time of service: 2023  9:54 AM       Subjective:     Chief Complaint:  Patient was personally seen and examined by me during this time period. Chart reviewed. Still with neck pain       Objective:       Vitals:       Last 24hrs VS reviewed since prior progress note.  Most recent are:    Visit Vitals  BP (!) 145/79 (BP 1 Location: Right upper arm, BP Patient Position: At rest)   Pulse 100   Temp 98 °F (36.7 °C)   Resp 10   Ht 5' 2\" (1.575 m)   Wt 49.6 kg (109 lb 5.6 oz)   SpO2 95%   BMI 20.00 kg/m²     SpO2 Readings from Last 6 Encounters:   23 95%   23 99%   23 99%          Intake/Output Summary (Last 24 hours) at 2023 0954  Last data filed at 2023 0700  Gross per 24 hour   Intake 1052.97 ml   Output --   Net 1052.97 ml        Exam:     Physical Exam:    Gen:  Well-developed, well-nourished, in no acute distress  HEENT:  Pink conjunctivae, PERRL, hearing intact to voice, moist mucous membranes  Neck:  Supple, has cervical collar   Resp:  No accessory muscle use, clear breath sounds without wheezes rales or rhonchi  Card:  No murmurs, normal S1, S2 without thrills, bruits or peripheral edema  Abd:  Soft, non-tender, non-distended, normoactive bowel sounds are present  Musc:  No cyanosis or clubbing  Skin:  No rashes or ulcers, skin turgor is good  Neuro:  Cranial nerves 3-12 are grossly intact, follows commands appropriately  Psych:  Good insight, oriented to person, place and time, alert    Medications Reviewed: (see below)    Lab Data Reviewed: (see below)    ______________________________________________________________________    Medications:     Current Facility-Administered Medications   Medication Dose Route Frequency    cyclobenzaprine (FLEXERIL) tablet 10 mg  10 mg Oral TID PRN    sodium chloride (NS) flush 5-40 mL  5-40 mL IntraVENous Q8H    sodium chloride (NS) flush 5-40 mL  5-40 mL IntraVENous PRN    acetaminophen (TYLENOL) tablet 650 mg  650 mg Oral Q6H PRN    Or    acetaminophen (TYLENOL) suppository 650 mg  650 mg Rectal Q6H PRN    polyethylene glycol (MIRALAX) packet 17 g  17 g Oral DAILY PRN    ondansetron (ZOFRAN) injection 4 mg  4 mg IntraVENous Q6H PRN    heparin 25,000 units in D5W 250 ml infusion  18-36 Units/kg/hr IntraVENous TITRATE    HYDROmorphone (DILAUDID) injection 1 mg  1 mg IntraVENous Q3H PRN    HYDROmorphone (DILAUDID) syringe 0.2 mg  0.2 mg IntraVENous Q4H PRN    albuterol-ipratropium (DUO-NEB) 2.5 MG-0.5 MG/3 ML  3 mL Nebulization Q6H RT    budesonide (PULMICORT) 500 mcg/2 ml nebulizer suspension  500 mcg Nebulization BID RT    benzonatate (TESSALON) capsule 100 mg  100 mg Oral TID    famotidine (PF) (PEPCID) 20 mg in 0.9% sodium chloride 10 mL injection  20 mg IntraVENous Q12H    atorvastatin (LIPITOR) tablet 10 mg  10 mg Oral QPM    losartan (COZAAR) tablet 50 mg  50 mg Oral QPM    hydrALAZINE (APRESOLINE) 20 mg/mL injection 20 mg  20 mg IntraVENous Q6H PRN    albuterol-ipratropium (DUO-NEB) 2.5 MG-0.5 MG/3 ML  3 mL Nebulization Q4H PRN          Lab Review:     Recent Labs     01/23/23  0517 01/22/23  1630 01/22/23  1050   WBC 8.7 9.2 10.7   HGB 11.1* 12.6 13.7   HCT 33.9* 38.0 41.3    240 283     Recent Labs     01/23/23  0517 01/22/23  1050    136   K 3.7 4.2    102   CO2 28 31   GLU 95 99   BUN 9 12   CREA 0.43* 0.71   CA 8.3* 9.3   MG 2.0  --    PHOS 2.7  --    ALB 2.7*  --    TBILI 0.9  --    ALT 13  --    INR 1.0  --      No results found for: GLUCPOC       Assessment / Plan:     51 yo hx of HTN, asthma, cervical spine stenosis s/p recent surgery, presented w/ neck pain, acute L IJ thrombosis    1) Acute L IJ thrombosis: likely a surgical complication. Will cont heparin gtt.   Awaiting Vascular surg eval    2) Cervical spine stenosis: s/p recent surg. Cont IV dilaudid prn severe pain.   Management per Ortho    3) Asthma: use nebs prns, incentive spirometry    4) HTN: cont losartan     **Prior records, notes, labs, radiology, and medications reviewed in [facility]**    Total time spent with patient care: 35 min  **I personally saw and examined the patient during this time period**                 Care Plan discussed with: Patient, nursing     Discussed:  Care Plan    Prophylaxis:  Hep SQ    Disposition:  Home w/Family           ___________________________________________________    Attending Physician: Ashok Cash MD

## 2023-01-23 NOTE — PROGRESS NOTES
ORTHOPAEDIC CERVICAL FUSION PROGRESS NOTE    NAME:     Jennifer Schofield   :       1975   MRN:       [id number]   DATE:      2023    S/P: C5-6 ACDF/Instrumentation performed by Dr. Bella Mike on 2023                 SUBJECTIVE:  C/O neck pain, controlled at this time recently given pain medication  Having some nausea   No arm pain or numbness  Notes having some voice hoarseness  Denies dysphagia  Denies vomiting, headache, dizziness, palpitations, chest pain or shortness of breath      Recent Labs     23  0517   HGB 11.1*   HCT 33.9*   INR 1.0      K 3.7      CO2 28   BUN 9   CREA 0.43*   GLU 95     Patient Vitals for the past 12 hrs:   BP Temp Pulse Resp SpO2 Height Weight   23 0700 (!) 145/79 98 °F (36.7 °C) 100 10 95 % -- --   23 0600 127/89 -- (!) 102 13 95 % -- --   23 0500 (!) 144/89 -- (!) 101 15 100 % -- --   23 0400 (!) 145/91 98.7 °F (37.1 °C) (!) 103 17 97 % -- --   23 0300 (!) 141/88 -- 98 12 97 % -- --   23 0200 (!) 158/92 -- 98 16 98 % -- --   23 0100 (!) 141/87 -- 99 21 94 % -- --   23 0011 (!) 147/96 98 °F (36.7 °C) (!) 103 19 100 % 5' 2\" (1.575 m) 49.6 kg (109 lb 5.6 oz)   23 2352 (!) 151/91 98.8 °F (37.1 °C) (!) 101 20 -- -- --       Exam:  Soft collar inplace / intact  Dressing unchanged. Steristrips intact. No swelling. No s/s of infx; No drainage  Positive strength/ROM bilat upper ext. Neuro intact to sensation  BL UEs NVID    Calves soft and NTTP  No peripheral edema  BL LEs NVID.        PLAN:  Ok to anticoagulate per Vasc Sx rec's  Continue prn pain medications- oral dilaudid ordered with IV dilaudid for breakthrough if needed  Tolerating regular diet  Scopolamine patch for nausea- worked well for patient during prior postop admission  Appreciate Hospitalist and Vascular Sx teams      Virgen [place]  Orthopaedic Surgery  Physician Assistant to Dr. Carmelo Rodriguez

## 2023-01-23 NOTE — PROGRESS NOTES
Pharmacy Dosing Note    Ordered medication: Famotidine 20 mg IV every 12 hours    New medication: Change to famotidine 20 mg PO  every 24 hours due to meeting criteria for IV to PO conversion per protocol.     Thank you,  Kristen Celis, PHARMD

## 2023-01-23 NOTE — DISCHARGE SUMMARY
DISCHARGE SUMMARY     Patient: Fidelia Gilliam                             Medical Record Number: [id number]                : 1975  Age: 52 y.o. Admit Date: 2023  Discharge Date: 2023  Admission Diagnosis: Cervical stenosis of spinal canal [M48.02]  Discharge Diagnosis: CERVICAL RADICULITIS, CERVICAL SPINAL STENOSIS  Procedures: Procedure(s):  C5-C6 ANTERIOR CERVICAL DISCECTOMY AND FUSION WITH INSTRUMENTATION  Surgeon: Beatrice Da Silva MD  Anesthesia: General  Complications: None     History of Present Illness:  Fidelia Gilliam is a 52 y.o. female with a history of intractable neck pain and radiculopathy. Despite conservative management and after clinical and radiographic evaluation, it was determined that she suffered from cervical stenosis and would benefit from Procedure(s):  C5-C6 ANTERIOR CERVICAL DISCECTOMY AND FUSION WITH INSTRUMENTATION, which she consented to undergo after a discussion of the risks, benefits, alternatives, rehab concerns, and potential complications of surgery. Hospital Course: Fidelia Gilliam tolerated the procedure well. She was transferred  to the recovery room in stable condition. After a brief stay, the patient was then transferred to the Orthopedic floor. On postoperative day #1, the dressing was clean and dry and she was neurovascularly intact. The patient was afebrile and vital signs were stable. Fidelia Gilliam did develop some postoperative nausea and vomiting that was initially did not resolve with intravenous zofran. She was treated with a scopolamine patch which resolved her symptoms. She was subsequently tolerating oral food and fluids without problem. She was deemed able to be discharged to Home  in stable condition on postoperative day 2.   She was provided with routine postoperative instructions and advised to follow up in my office in 3 weeks following discharge from the hospital.  She was given a brace and prescriptions for medication to control post-operative symptoms. Discharge Medications:    START taking these medications        Instructions Each Dose to Equal Morning Noon Evening Bedtime   cyclobenzaprine 10 mg tablet  Commonly known as: FLEXERIL  Your last dose was: Not administered, Take as prescribed      Take 1 Tablet by mouth three (3) times daily as needed for Muscle Spasm(s). 10 mg         docusate sodium 100 mg capsule  Commonly known as: COLACE  Your last dose was: Not administered, Take as prescribed      Take 1 Capsule by mouth two (2) times a day for 30 days. Indications: constipation   100 mg         HYDROcodone-acetaminophen 5-325 mg per tablet  Commonly known as: [address]  Your last dose was: Not administered, Take as prescribed      Take 1-2 Tablets by mouth every four (4) hours as needed for Pain for up to 7 days. Max Daily Amount: 12 Tablets. 1-2 Tablet         ondansetron 4 mg disintegrating tablet  Commonly known as: ZOFRAN ODT  Your last dose was: 1/18/2022 at 1:56 PM  Your next dose is: Take next when needed      Take 1 Tablet by mouth every six (6) hours as needed for Nausea or Vomiting. 4 mg                CHANGE how you take these medications        Instructions Each Dose to Equal Morning Noon Evening Bedtime   fluticasone propionate 50 mcg/actuation nasal spray  Commonly known as: FLONASE  What changed:   medication strength  how much to take  how to take this  Your last dose was: 1/19/2022 at 8:27 AM  Your next dose is: 1/19/2022 at 8:27 PM      2 Sprays by Both Nostrils route two (2) times a day. 2 Spray                CONTINUE taking these medications        Instructions Each Dose to Equal Morning Noon Evening Bedtime   ALBUTEROL SULFATE IN  Your last dose was: 1/18/2022 at 12:14A M  Your next dose is: Take next when needed      Take  by inhalation as needed.  0.5mg/3mg per 3mL          atorvastatin 10 mg tablet  Commonly known as: LIPITOR  Your last dose was: 1/18/2022 at 9:06 PM  Your next dose is: 1/18/2022 at 9:00 PM      Take 10 mg by mouth every evening. Daily in PM   10 mg         AZELASTINE NA  Your last dose was: 1/19/2022 at 8:27 AM  Your next dose is: 1/20/2022 at 8:27 AM      by Nasal route daily. Daily in AM          cetirizine 10 mg tablet  Commonly known as: ZYRTEC  Your last dose was: 1/19/2022 at 8:27 AM  Your next dose is: 1/20/2022 at 8:27 AM      Take 10 mg by mouth daily. Daily in AM   10 mg         hydroCHLOROthiazide 12.5 mg tablet  Commonly known as: HYDRODIURIL  Your last dose was: 1/19/2022 at 8:31 AM  Your next dose is: 1/20/2022 at 8:27 AM      Take 12.5 mg by mouth daily. Daily in AM   12.5 mg         losartan 50 mg tablet  Commonly known as: COZAAR  Your last dose was: 1/18/2022 at 9:06 PM  Your next dose is: 1/19/2022 at 9:06 PM      Take 50 mg by mouth every evening. Daily in PM   50 mg         montelukast 10 mg tablet  Commonly known as: SINGULAIR  Your last dose was: 1/18/2022 at 9:06 PM  Your next dose is: 1/19/2022 at 9:06 PM      Take 10 mg by mouth every evening. Daily in PM   10 mg         omeprazole 40 mg capsule  Commonly known as: PRILOSEC  Your last dose was: Not administered, Take as prescribed      Take 40 mg by mouth daily.  Daily in AM   40 mg                STOP taking these medications      azithromycin 250 mg tablet  Commonly known as: CAHRLA Cadena  1/19/2023  Orthopaedic Surgery  Physician Assistant to Dr. Souleymane Carroll

## 2023-01-23 NOTE — PROGRESS NOTES
Reason for Readmission:     acute left IJ thrombosis,recent cervical spine surgery,presented with neck pain         Previous admission 1/17/23 to 1/19/23 (cervical spine surgery)    RUR Score/Risk Level:     9%    PCP: First and Last name:  Dr Figueroa Reasons   Name of Practice: surgeon   Are you a current patient: Yes/No: yes   Approximate date of last visit: surgery 1/17-1/19/23       Is a Care Conference indicated: no      Did you attend your follow up appointment (s): If not, why not:         Resources/supports as identified by patient/family:          Top Challenges facing patient (as identified by patient/family and CM): Finances/Medication cost?     No challenges  Transportation      family  Support system or lack thereof? Supportive mother/family  Living arrangements? With mother         Self-care/ADLs/Cognition? Typically performs self care and is typically independent in all aspects        Current Advanced Directive/Advance Care Plan:             Plan for utilizing home health:   to be determined by discharge             Transition of Care Plan:    Based on readmission, the patient's previous Plan of Care   has been evaluated and/or modified. The current Transition of Care Plan is:            Pt was admitted with neck pain after recent cervical surgery. Pt is on a heparin gtt. Once an anticoagulant has been decided upon,I will provide pt with the coupon to correlate with the prescribed medication. Pt has commercial insurance so will be eligible for the commercial coupon which will bring her co-pay signficantly down and affordable. Pt uses [facility] in [address] [facility] [place]prior to [place] , will please contact Aldo Hood @ [phone] to insure the pharmacy has the anticoagulant in stock. Pt lives with her mother in a one story home with 4 entry steps. Pt does not currently have any DME,home health or rehab .   I am following pt for other needs also such as home health which is not currrently anticipated.       Jovan Coe    Readmission Assessment  Number of days since last admission?: 1-7 days  Previous disposition: Home with Family  Who is being interviewed?: Patient  What was the patient's/caregiver's perception as to why they think they needed to return back to the hospital?: Other (Comment) (developed blood clot,right IJ blood clot)  Did you visit your Primary Care Physician after you left the hospital, before you returned this time?: No  Why weren't you able to visit your PCP?: Did not have an appointment  Did you see a specialist, such as Cardiac, Pulmonary, Orthopedic Physician, etc. after you left the hospital?: No  Who advised the patient to return to the hospital?: Self-referral (neck pain)  In our efforts to provide the best possible care to you and others like you, can you think of anything that we could have done to help you after you left the hospital the first time, so that you might not have needed to return so soon?: Other (Comment) (anticoagulant therapy)

## 2023-01-23 NOTE — ED NOTES
TRANSFER - OUT REPORT:    Verbal report given to MARKOS Brumfield(name) on Kary [place]  being transferred to ICU bed 01(unit) for routine progression of care       Report consisted of patients Situation, Background, Assessment and   Recommendations(SBAR). Information from the following report(s) Kardex, Intake/Output, MAR, Recent Results, and Cardiac Rhythm NSR  was reviewed with the receiving nurse. Lines:   Peripheral IV 01/22/23 Right Antecubital (Active)   Site Assessment Clean, dry, & intact 01/22/23 1700   Phlebitis Assessment 0 01/22/23 1700   Infiltration Assessment 0 01/22/23 1700   Dressing Status Clean, dry, & intact 01/22/23 1700   Dressing Type Transparent 01/22/23 1700   Hub Color/Line Status Pink 01/22/23 1700       Peripheral IV 01/22/23 Distal;Left Basilic (Active)        Opportunity for questions and clarification was provided.       Patient transported with:   Monitor  Registered Nurse

## 2023-01-23 NOTE — PROGRESS NOTES
Problem: Pain  Goal: *Control of Pain  Outcome: Progressing Towards Goal     Problem: Treatment of deep venous thrombosis  Goal: *Absence of embolism and improvement of existing deep venous thrombosis  Outcome: Progressing Towards Goal  Goal: *Absence of bleeding  Outcome: Progressing Towards Goal  Goal: *Optimal pain control at patient's stated goal  Outcome: Progressing Towards Goal  Goal: *Labs within defined limits  Outcome: Progressing Towards Goal  Goal: *Skin integrity maintained  Outcome: Progressing Towards Goal     Problem: Pain  Goal: *Control of Pain  1/23/2023 0109 by Aliyah Cardenas RN  Outcome: Progressing Towards Goal  1/23/2023 0108 by Aliyah Cardenas RN  Outcome: Progressing Towards Goal     Problem: Treatment of deep venous thrombosis  Goal: *Absence of embolism and improvement of existing deep venous thrombosis  1/23/2023 0109 by Aliyah Cardenas RN  Outcome: Progressing Towards Goal  1/23/2023 0108 by Aliyah Cardenas RN  Outcome: Progressing Towards Goal  Goal: *Absence of bleeding  1/23/2023 0109 by Aliyah Cardenas RN  Outcome: Progressing Towards Goal  1/23/2023 0108 by Aliyah Cardenas RN  Outcome: Progressing Towards Goal  Goal: *Optimal pain control at patient's stated goal  1/23/2023 0109 by Aliyah Cardenas RN  Outcome: Progressing Towards Goal  1/23/2023 0108 by Aliyah Cardenas RN  Outcome: Progressing Towards Goal  Goal: *Labs within defined limits  1/23/2023 0109 by Aliyah Cardenas RN  Outcome: Progressing Towards Goal  1/23/2023 0108 by Aliyah Cardenas RN  Outcome: Progressing Towards Goal  Goal: *Skin integrity maintained  1/23/2023 0109 by Aliyah Cardenas RN  Outcome: Progressing Towards Goal  1/23/2023 0108 by Aliyah Cardenas RN  Outcome: Progressing Towards Goal     Problem: Falls - Risk of  Goal: *Absence of Falls  Description: Document Thomas John Fall Risk and appropriate interventions in the flowsheet.   Outcome: Progressing Towards Goal  Note: Fall Risk Interventions:

## 2023-01-23 NOTE — PROGRESS NOTES
TRANSFER - IN REPORT:    Verbal report received from [address], RN(name) on Guy Valle  being received from ER(unit) for routine progression of care      Report consisted of patients Situation, Background, Assessment and   Recommendations(SBAR). Information from the following report(s) SBAR, Kardex, Procedure Summary, MAR, and Cardiac Rhythm ST  was reviewed with the receiving nurse. Opportunity for questions and clarification was provided. 0000: Assessment completed upon patients arrival to unit and care assumed. PRN Dilaudid administered for mild pain, Heparin verified. Primary Nurse Verna Chavarria RN and Oswald Orlando RN performed a dual skin assessment on this patient Impairment noted- see wound doc flow sheet  Ezra score is 23    0400: Reassessment completed. No changes from previous assessment    0500: Labs drawn, pt ambulated to bathroom, PRN Dilaudid administered for pain    0600: Anti-XA therapeutic, no changes to heparin    0700: Bedside shift change report given to Erica Dixon RN (oncoming nurse) by Ana María Hill RN (offgoing nurse).  Report included the following information SBAR, Kardex, Procedure Summary, Intake/Output, MAR, Recent Results, and Cardiac Rhythm ST .

## 2023-01-24 LAB — UFH PPP CHRO-ACNC: 0.5 IU/ML

## 2023-01-24 PROCEDURE — 74011250637 HC RX REV CODE- 250/637: Performed by: PHYSICIAN ASSISTANT

## 2023-01-24 PROCEDURE — 36415 COLL VENOUS BLD VENIPUNCTURE: CPT

## 2023-01-24 PROCEDURE — 93005 ELECTROCARDIOGRAM TRACING: CPT

## 2023-01-24 PROCEDURE — 74011000250 HC RX REV CODE- 250: Performed by: HOSPITALIST

## 2023-01-24 PROCEDURE — 74011250637 HC RX REV CODE- 250/637: Performed by: INTERNAL MEDICINE

## 2023-01-24 PROCEDURE — 65270000029 HC RM PRIVATE

## 2023-01-24 PROCEDURE — 74011250637 HC RX REV CODE- 250/637: Performed by: HOSPITALIST

## 2023-01-24 PROCEDURE — 85520 HEPARIN ASSAY: CPT

## 2023-01-24 PROCEDURE — 94761 N-INVAS EAR/PLS OXIMETRY MLT: CPT

## 2023-01-24 PROCEDURE — 74011250636 HC RX REV CODE- 250/636: Performed by: INTERNAL MEDICINE

## 2023-01-24 PROCEDURE — 94640 AIRWAY INHALATION TREATMENT: CPT

## 2023-01-24 PROCEDURE — 94664 DEMO&/EVAL PT USE INHALER: CPT

## 2023-01-24 PROCEDURE — 74011250636 HC RX REV CODE- 250/636: Performed by: HOSPITALIST

## 2023-01-24 RX ORDER — HYDRALAZINE HYDROCHLORIDE 20 MG/ML
10 INJECTION INTRAMUSCULAR; INTRAVENOUS
Status: DISCONTINUED | OUTPATIENT
Start: 2023-01-24 | End: 2023-01-25

## 2023-01-24 RX ORDER — LORAZEPAM 2 MG/ML
0.5 INJECTION INTRAMUSCULAR
Status: DISCONTINUED | OUTPATIENT
Start: 2023-01-25 | End: 2023-01-25

## 2023-01-24 RX ADMIN — ONDANSETRON 4 MG: 2 INJECTION INTRAMUSCULAR; INTRAVENOUS at 15:19

## 2023-01-24 RX ADMIN — Medication 10 ML: at 22:55

## 2023-01-24 RX ADMIN — BUDESONIDE 500 MCG: 0.5 INHALANT RESPIRATORY (INHALATION) at 20:20

## 2023-01-24 RX ADMIN — LOSARTAN POTASSIUM 50 MG: 50 TABLET, FILM COATED ORAL at 17:39

## 2023-01-24 RX ADMIN — HYDROMORPHONE HYDROCHLORIDE 4 MG: 2 TABLET ORAL at 02:01

## 2023-01-24 RX ADMIN — Medication 10 ML: at 21:00

## 2023-01-24 RX ADMIN — CYCLOBENZAPRINE 10 MG: 10 TABLET, FILM COATED ORAL at 20:50

## 2023-01-24 RX ADMIN — APIXABAN 10 MG: 5 TABLET, FILM COATED ORAL at 09:06

## 2023-01-24 RX ADMIN — ONDANSETRON 4 MG: 2 INJECTION INTRAMUSCULAR; INTRAVENOUS at 23:44

## 2023-01-24 RX ADMIN — HYDROMORPHONE HYDROCHLORIDE 4 MG: 2 TABLET ORAL at 05:16

## 2023-01-24 RX ADMIN — BENZONATATE 100 MG: 100 CAPSULE ORAL at 15:19

## 2023-01-24 RX ADMIN — IPRATROPIUM BROMIDE AND ALBUTEROL SULFATE 3 ML: 2.5; .5 SOLUTION RESPIRATORY (INHALATION) at 13:44

## 2023-01-24 RX ADMIN — HYDRALAZINE HYDROCHLORIDE 20 MG: 20 INJECTION INTRAMUSCULAR; INTRAVENOUS at 23:06

## 2023-01-24 RX ADMIN — FAMOTIDINE 20 MG: 20 TABLET, FILM COATED ORAL at 08:21

## 2023-01-24 RX ADMIN — IPRATROPIUM BROMIDE AND ALBUTEROL SULFATE 3 ML: 2.5; .5 SOLUTION RESPIRATORY (INHALATION) at 20:15

## 2023-01-24 RX ADMIN — BENZONATATE 100 MG: 100 CAPSULE ORAL at 22:55

## 2023-01-24 RX ADMIN — IPRATROPIUM BROMIDE AND ALBUTEROL SULFATE 3 ML: 2.5; .5 SOLUTION RESPIRATORY (INHALATION) at 02:37

## 2023-01-24 RX ADMIN — HYDROMORPHONE HYDROCHLORIDE 4 MG: 2 TABLET ORAL at 16:22

## 2023-01-24 RX ADMIN — Medication 10 ML: at 05:16

## 2023-01-24 RX ADMIN — APIXABAN 10 MG: 5 TABLET, FILM COATED ORAL at 20:50

## 2023-01-24 RX ADMIN — ATORVASTATIN CALCIUM 10 MG: 20 TABLET, FILM COATED ORAL at 17:39

## 2023-01-24 RX ADMIN — HYDROMORPHONE HYDROCHLORIDE 4 MG: 2 TABLET ORAL at 08:26

## 2023-01-24 RX ADMIN — ACETAMINOPHEN 650 MG: 325 TABLET ORAL at 23:06

## 2023-01-24 RX ADMIN — BENZONATATE 100 MG: 100 CAPSULE ORAL at 08:22

## 2023-01-24 RX ADMIN — FAMOTIDINE 20 MG: 20 TABLET, FILM COATED ORAL at 20:50

## 2023-01-24 RX ADMIN — IPRATROPIUM BROMIDE AND ALBUTEROL SULFATE 3 ML: 2.5; .5 SOLUTION RESPIRATORY (INHALATION) at 07:59

## 2023-01-24 RX ADMIN — BUDESONIDE 500 MCG: 0.5 INHALANT RESPIRATORY (INHALATION) at 07:59

## 2023-01-24 RX ADMIN — HYDROMORPHONE HYDROCHLORIDE 2 MG: 2 TABLET ORAL at 12:44

## 2023-01-24 NOTE — PROGRESS NOTES
1/24/2023 1:31 PM   Pt transferred from ICU, noted pt started on Eliquis. CM attempted to call to [facility] from different phone lines, phone call will not go through, ringing busy. Confirmed phone number for [facility] is [phone]. CM provided pt with Eliquis coupon card. Will follow.  SE Barney

## 2023-01-24 NOTE — PROGRESS NOTES
1025: TRANSFER - OUT REPORT:    Verbal report given to Dayami(name) on Jessica Agee  being transferred to 4th floor(unit) for routine progression of care       Report consisted of patients Situation, Background, Assessment and   Recommendations(SBAR). Information from the following report(s) SBAR, Kardex, Intake/Output, MAR, Recent Results, and Cardiac Rhythm ST  was reviewed with the receiving nurse. Lines:   Peripheral IV 01/22/23 Right Antecubital (Active)   Site Assessment Clean, dry, & intact 01/24/23 0738   Phlebitis Assessment 0 01/24/23 0738   Infiltration Assessment 0 01/24/23 0738   Dressing Status Clean, dry, & intact 01/24/23 0738   Dressing Type Transparent 01/24/23 0400   Hub Color/Line Status Pink; Infusing 01/24/23 0400   Action Taken Open ports on tubing capped 01/24/23 0400   Alcohol Cap Used Yes 01/24/23 0400       Peripheral IV 01/22/23 Distal;Left Basilic (Active)   Site Assessment Clean, dry, & intact 01/24/23 0738   Phlebitis Assessment 0 01/24/23 0738   Infiltration Assessment 0 01/24/23 0738   Dressing Status Clean, dry, & intact 01/24/23 0738   Dressing Type Bacteriocidal;Transparent 01/24/23 0400   Hub Color/Line Status Pink; Infusing 01/24/23 0400   Action Taken Open ports on tubing capped 01/24/23 0400   Alcohol Cap Used Yes 01/24/23 0400        Opportunity for questions and clarification was provided.       Patient transported with:   Registered Nurse

## 2023-01-24 NOTE — PROGRESS NOTES
Boby Robison [facility]  [address] [facility], [address] [place] [address]  [phone]      Hospitalist Progress Note      NAME: Jose Hughes   :  1975  MRM:  [id number]    Date/Time of service: 2023  9:42 AM       Subjective:     Chief Complaint:  Patient was personally seen and examined by me during this time period. Chart reviewed. Denied chest pain, SOB. No bleeding        Objective:       Vitals:       Last 24hrs VS reviewed since prior progress note.  Most recent are:    Visit Vitals  BP (!) 139/94   Pulse 99   Temp 98.1 °F (36.7 °C)   Resp 12   Ht 5' 2\" (1.575 m)   Wt 49.6 kg (109 lb 5.6 oz)   SpO2 100%   BMI 20.00 kg/m²     SpO2 Readings from Last 6 Encounters:   23 100%   23 99%   23 99%          Intake/Output Summary (Last 24 hours) at 2023 1753  Last data filed at 2023 0940  Gross per 24 hour   Intake 742.4 ml   Output --   Net 742.4 ml          Exam:     Physical Exam:    Gen:  Well-developed, well-nourished, in no acute distress  HEENT:  Pink conjunctivae, PERRL, hearing intact to voice, moist mucous membranes  Neck:  Supple, has cervical collar   Resp:  No accessory muscle use, clear breath sounds without wheezes rales or rhonchi  Card:  No murmurs, normal S1, S2 without thrills, bruits or peripheral edema  Abd:  Soft, non-tender, non-distended, normoactive bowel sounds are present  Musc:  No cyanosis or clubbing  Skin:  No rashes or ulcers, skin turgor is good  Neuro:  Cranial nerves 3-12 are grossly intact, follows commands appropriately  Psych:  Good insight, oriented to person, place and time, alert    Medications Reviewed: (see below)    Lab Data Reviewed: (see below)    ______________________________________________________________________    Medications:     Current Facility-Administered Medications   Medication Dose Route Frequency    apixaban (ELIQUIS) tablet 10 mg  10 mg Oral Q12H    [START ON 2023] apixaban (ELIQUIS) tablet 5 mg 5 mg Oral Q12H    cyclobenzaprine (FLEXERIL) tablet 10 mg  10 mg Oral TID PRN    scopolamine (TRANSDERM-SCOP) 1 mg over 3 days 1 Patch  1 Patch TransDERmal Q72H    HYDROmorphone (DILAUDID) tablet 2 mg  2 mg Oral Q3H PRN    HYDROmorphone (DILAUDID) tablet 4 mg  4 mg Oral Q3H PRN    HYDROmorphone (DILAUDID) syringe 0.5 mg  0.5 mg IntraVENous Q3H PRN    famotidine (PEPCID) tablet 20 mg  20 mg Oral Q12H    sodium chloride (NS) flush 5-40 mL  5-40 mL IntraVENous Q8H    sodium chloride (NS) flush 5-40 mL  5-40 mL IntraVENous PRN    acetaminophen (TYLENOL) tablet 650 mg  650 mg Oral Q6H PRN    Or    acetaminophen (TYLENOL) suppository 650 mg  650 mg Rectal Q6H PRN    polyethylene glycol (MIRALAX) packet 17 g  17 g Oral DAILY PRN    ondansetron (ZOFRAN) injection 4 mg  4 mg IntraVENous Q6H PRN    albuterol-ipratropium (DUO-NEB) 2.5 MG-0.5 MG/3 ML  3 mL Nebulization Q6H RT    budesonide (PULMICORT) 500 mcg/2 ml nebulizer suspension  500 mcg Nebulization BID RT    benzonatate (TESSALON) capsule 100 mg  100 mg Oral TID    atorvastatin (LIPITOR) tablet 10 mg  10 mg Oral QPM    losartan (COZAAR) tablet 50 mg  50 mg Oral QPM    hydrALAZINE (APRESOLINE) 20 mg/mL injection 20 mg  20 mg IntraVENous Q6H PRN    albuterol-ipratropium (DUO-NEB) 2.5 MG-0.5 MG/3 ML  3 mL Nebulization Q4H PRN          Lab Review:     Recent Labs     01/23/23  0517 01/22/23  1630 01/22/23  1050   WBC 8.7 9.2 10.7   HGB 11.1* 12.6 13.7   HCT 33.9* 38.0 41.3    240 283       Recent Labs     01/23/23  0517 01/22/23  1050    136   K 3.7 4.2    102   CO2 28 31   GLU 95 99   BUN 9 12   CREA 0.43* 0.71   CA 8.3* 9.3   MG 2.0  --    PHOS 2.7  --    ALB 2.7*  --    TBILI 0.9  --    ALT 13  --    INR 1.0  --        No results found for: GLUCPOC       Assessment / Plan:     53 yo hx of HTN, asthma, cervical spine stenosis s/p recent surgery, presented w/ neck pain, acute L IJ thrombosis    1) Acute L IJ thrombosis: likely a surgical complication. Will stop heparin gtt and start Eliquis. Vascular was following, no interventions at this time    2) Cervical spine stenosis: s/p recent surg. Ortho cleared for full anticoagulation.   Cont IV dilaudid prn severe pain    3) Asthma: use nebs prns, incentive spirometry    4) HTN: cont losartan     **Prior records, notes, labs, radiology, and medications reviewed in [facility]**    Total time spent with patient care: 35 min  **I personally saw and examined the patient during this time period**                 Care Plan discussed with: Patient, nursing     Discussed:  Care Plan    Prophylaxis:  eliquis     Disposition:  Home w/Family           ___________________________________________________    Attending Physician: Teresa Longoria MD

## 2023-01-24 NOTE — PROGRESS NOTES
Problem: Pain  Goal: *Control of Pain  Outcome: Progressing Towards Goal     Problem: Treatment of deep venous thrombosis  Goal: *Absence of embolism and improvement of existing deep venous thrombosis  Outcome: Progressing Towards Goal  Goal: *Absence of bleeding  Outcome: Progressing Towards Goal  Goal: *Optimal pain control at patient's stated goal  Outcome: Progressing Towards Goal  Goal: *Labs within defined limits  Outcome: Progressing Towards Goal  Goal: *Skin integrity maintained  Outcome: Progressing Towards Goal     Problem: Falls - Risk of  Goal: *Absence of Falls  Description: Document Naomi Fall Risk and appropriate interventions in the flowsheet.   Outcome: Progressing Towards Goal  Note: Fall Risk Interventions:

## 2023-01-24 NOTE — PROGRESS NOTES
Bedside and Verbal shift change report given to [address] [place] [address] (oncoming nurse) by Deneen Maria RN (offgoing nurse). Report included the following information SBAR, Intake/Output, MAR, Recent Results, Cardiac Rhythm: NSR-ST and Alarm Parameters . Primary Nurse Ty Ayon RN and Deneen Maria RN performed a dual skin assessment on this patient No impairment noted  Ezra score is see flowsheets    See flowsheets for all assessments, see MAR for all medication administrations. Bedside and Verbal shift change report given to MARKOS Welch (oncoming nurse) by Derrell Oliver RN (offgoing nurse). Report included the following information SBAR, Intake/Output, MAR, Recent Results, Cardiac Rhythm: and Alarm Parameters .

## 2023-01-24 NOTE — PROGRESS NOTES
ORTHOPAEDIC CERVICAL FUSION PROGRESS NOTE    NAME:     Tristen Nunez   :       1975   MRN:       [id number]   DATE:      2023                  SUBJECTIVE:  C/O L sided posterior neck pain that intermittently radiates to the L side of her head    No arm pain or numbness  Having a little nausea  Denies vomiting, dizziness, new numbness or weakness, speech/hearing/vision changes, chest pain or shortness of breath  Pain controlled    Has been transferred to the 4th floor today    Recent Labs     23  0517   HGB 11.1*   HCT 33.9*   INR 1.0      K 3.7      CO2 28   BUN 9   CREA 0.43*   GLU 95     Patient Vitals for the past 12 hrs:   BP Temp Pulse Resp SpO2   23 0800 -- -- -- -- 100 %   23 0730 (!) 139/94 98.1 °F (36.7 °C) 99 12 100 %   23 0700 138/86 -- (!) 104 15 97 %   23 0600 129/83 -- (!) 115 15 100 %   23 0500 123/80 -- (!) 110 12 97 %   23 0400 109/70 98.5 °F (36.9 °C) (!) 116 14 95 %   23 0300 125/80 -- (!) 120 17 99 %   23 0239 -- -- -- -- 100 %   23 0200 126/86 -- (!) 107 13 96 %   23 0100 123/80 -- (!) 106 13 97 %   23 0000 125/89 98.2 °F (36.8 °C) (!) 110 15 96 %   23 2300 (!) 130/90 -- (!) 121 14 94 %   23 2200 125/86 -- (!) 130 14 95 %       Exam:  Soft collar inplace / intact  Dressings clean and dry  Positive strength/ROM bilat upper ext. Neuro intact to sensation  BL UEs NVID  Alert, awake and appropriate. Non-focal exam    Calves soft and NTTP  No peripheral edema  BL LEs NVID.          PLAN:  Being transitioned to Eliquis today  Continue prn pain medications- oral dilaudid with IV dilaudid for breakthrough if needed  Tolerating regular diet  Scopolamine patch for nausea- worked well for patient during prior postop admission  Appreciate Hospitalist and Vascular Sx teams      Eleni Avalos [place]  Orthopaedic Surgery  Physician Assistant to Dr. Ryanne Olguin

## 2023-01-25 ENCOUNTER — APPOINTMENT (OUTPATIENT)
Dept: NON INVASIVE DIAGNOSTICS | Age: 48
DRG: 300 | End: 2023-01-25
Attending: INTERNAL MEDICINE
Payer: COMMERCIAL

## 2023-01-25 ENCOUNTER — APPOINTMENT (OUTPATIENT)
Dept: CT IMAGING | Age: 48
DRG: 300 | End: 2023-01-25
Attending: INTERNAL MEDICINE
Payer: COMMERCIAL

## 2023-01-25 LAB
ANION GAP SERPL CALC-SCNC: 5 MMOL/L (ref 5–15)
ATRIAL RATE: 114 BPM
BUN SERPL-MCNC: 10 MG/DL (ref 6–20)
BUN/CREAT SERPL: 16 (ref 12–20)
CALCIUM SERPL-MCNC: 8.9 MG/DL (ref 8.5–10.1)
CALCULATED P AXIS, ECG09: 67 DEGREES
CALCULATED R AXIS, ECG10: 44 DEGREES
CALCULATED T AXIS, ECG11: 24 DEGREES
CHLORIDE SERPL-SCNC: 105 MMOL/L (ref 97–108)
CO2 SERPL-SCNC: 26 MMOL/L (ref 21–32)
CREAT SERPL-MCNC: 0.63 MG/DL (ref 0.55–1.02)
DIAGNOSIS, 93000: NORMAL
ECHO AO ASC DIAM: 2.8 CM
ECHO AO ASCENDING AORTA INDEX: 1.89 CM/M2
ECHO AV AREA PEAK VELOCITY: 1.7 CM2
ECHO AV AREA/BSA PEAK VELOCITY: 1.1 CM2/M2
ECHO AV PEAK GRADIENT: 8 MMHG
ECHO AV PEAK VELOCITY: 1.4 M/S
ECHO AV VELOCITY RATIO: 0.86
ECHO LA DIAMETER INDEX: 1.35 CM/M2
ECHO LA DIAMETER: 2 CM
ECHO LA VOL 2C: 16 ML (ref 22–52)
ECHO LA VOL 4C: 15 ML (ref 22–52)
ECHO LA VOL BP: 16 ML (ref 22–52)
ECHO LA VOL/BSA BIPLANE: 11 ML/M2 (ref 16–34)
ECHO LA VOLUME AREA LENGTH: 17 ML
ECHO LA VOLUME INDEX A2C: 11 ML/M2 (ref 16–34)
ECHO LA VOLUME INDEX A4C: 10 ML/M2 (ref 16–34)
ECHO LA VOLUME INDEX AREA LENGTH: 11 ML/M2 (ref 16–34)
ECHO LV E' LATERAL VELOCITY: 11 CM/S
ECHO LV E' SEPTAL VELOCITY: 13 CM/S
ECHO LV EDV A2C: 71 ML
ECHO LV EDV A4C: 78 ML
ECHO LV EDV BP: 75 ML (ref 56–104)
ECHO LV EDV INDEX A4C: 53 ML/M2
ECHO LV EDV INDEX BP: 51 ML/M2
ECHO LV EDV NDEX A2C: 48 ML/M2
ECHO LV EJECTION FRACTION A2C: 65 %
ECHO LV EJECTION FRACTION A4C: 68 %
ECHO LV EJECTION FRACTION BIPLANE: 66 % (ref 55–100)
ECHO LV ESV A2C: 25 ML
ECHO LV ESV A4C: 25 ML
ECHO LV ESV BP: 25 ML (ref 19–49)
ECHO LV ESV INDEX A2C: 17 ML/M2
ECHO LV ESV INDEX A4C: 17 ML/M2
ECHO LV ESV INDEX BP: 17 ML/M2
ECHO LV FRACTIONAL SHORTENING: 34 % (ref 28–44)
ECHO LV INTERNAL DIMENSION DIASTOLE INDEX: 2.57 CM/M2
ECHO LV INTERNAL DIMENSION DIASTOLIC: 3.8 CM (ref 3.9–5.3)
ECHO LV INTERNAL DIMENSION SYSTOLIC INDEX: 1.69 CM/M2
ECHO LV INTERNAL DIMENSION SYSTOLIC: 2.5 CM
ECHO LV IVSD: 1 CM (ref 0.6–0.9)
ECHO LV MASS 2D: 109 G (ref 67–162)
ECHO LV MASS INDEX 2D: 73.7 G/M2 (ref 43–95)
ECHO LV POSTERIOR WALL DIASTOLIC: 0.9 CM (ref 0.6–0.9)
ECHO LV RELATIVE WALL THICKNESS RATIO: 0.47
ECHO LVOT AREA: 2 CM2
ECHO LVOT DIAM: 1.6 CM
ECHO LVOT PEAK GRADIENT: 6 MMHG
ECHO LVOT PEAK VELOCITY: 1.2 M/S
ECHO MV A VELOCITY: 0.61 M/S
ECHO MV E DECELERATION TIME (DT): 116.2 MS
ECHO MV E VELOCITY: 0.84 M/S
ECHO MV E/A RATIO: 1.38
ECHO MV E/E' LATERAL: 7.64
ECHO MV E/E' RATIO (AVERAGED): 7.05
ECHO MV E/E' SEPTAL: 6.46
ECHO MV REGURGITANT PEAK GRADIENT: 104 MMHG
ECHO MV REGURGITANT PEAK VELOCITY: 5.1 M/S
ECHO PV MAX VELOCITY: 1 M/S
ECHO PV PEAK GRADIENT: 4 MMHG
ECHO RV FREE WALL PEAK S': 16 CM/S
ECHO RV INTERNAL DIMENSION: 2.9 CM
ECHO RV TAPSE: 2.2 CM (ref 1.7–?)
ECHO TV REGURGITANT MAX VELOCITY: 2.06 M/S
ECHO TV REGURGITANT PEAK GRADIENT: 17 MMHG
ERYTHROCYTE [DISTWIDTH] IN BLOOD BY AUTOMATED COUNT: 12.5 % (ref 11.5–14.5)
GLUCOSE SERPL-MCNC: 102 MG/DL (ref 65–100)
HCT VFR BLD AUTO: 34.8 % (ref 35–47)
HGB BLD-MCNC: 11.3 G/DL (ref 11.5–16)
MAGNESIUM SERPL-MCNC: 2.3 MG/DL (ref 1.6–2.4)
MCH RBC QN AUTO: 28.3 PG (ref 26–34)
MCHC RBC AUTO-ENTMCNC: 32.5 G/DL (ref 30–36.5)
MCV RBC AUTO: 87 FL (ref 80–99)
NRBC # BLD: 0 K/UL (ref 0–0.01)
NRBC BLD-RTO: 0 PER 100 WBC
P-R INTERVAL, ECG05: 140 MS
PHOSPHATE SERPL-MCNC: 3 MG/DL (ref 2.6–4.7)
PLATELET # BLD AUTO: 237 K/UL (ref 150–400)
PMV BLD AUTO: 10.1 FL (ref 8.9–12.9)
POTASSIUM SERPL-SCNC: 4.7 MMOL/L (ref 3.5–5.1)
Q-T INTERVAL, ECG07: 314 MS
QRS DURATION, ECG06: 80 MS
QTC CALCULATION (BEZET), ECG08: 432 MS
RBC # BLD AUTO: 4 M/UL (ref 3.8–5.2)
SODIUM SERPL-SCNC: 136 MMOL/L (ref 136–145)
TSH SERPL DL<=0.05 MIU/L-ACNC: 0.69 UIU/ML (ref 0.36–3.74)
VENTRICULAR RATE, ECG03: 114 BPM
WBC # BLD AUTO: 8.6 K/UL (ref 3.6–11)

## 2023-01-25 PROCEDURE — 94640 AIRWAY INHALATION TREATMENT: CPT

## 2023-01-25 PROCEDURE — 85027 COMPLETE CBC AUTOMATED: CPT

## 2023-01-25 PROCEDURE — 94761 N-INVAS EAR/PLS OXIMETRY MLT: CPT

## 2023-01-25 PROCEDURE — 36415 COLL VENOUS BLD VENIPUNCTURE: CPT

## 2023-01-25 PROCEDURE — 65270000029 HC RM PRIVATE

## 2023-01-25 PROCEDURE — 94664 DEMO&/EVAL PT USE INHALER: CPT

## 2023-01-25 PROCEDURE — 93306 TTE W/DOPPLER COMPLETE: CPT | Performed by: INTERNAL MEDICINE

## 2023-01-25 PROCEDURE — 74011000636 HC RX REV CODE- 636: Performed by: INTERNAL MEDICINE

## 2023-01-25 PROCEDURE — 80048 BASIC METABOLIC PNL TOTAL CA: CPT

## 2023-01-25 PROCEDURE — 71275 CT ANGIOGRAPHY CHEST: CPT

## 2023-01-25 PROCEDURE — 84100 ASSAY OF PHOSPHORUS: CPT

## 2023-01-25 PROCEDURE — 74011250637 HC RX REV CODE- 250/637: Performed by: INTERNAL MEDICINE

## 2023-01-25 PROCEDURE — 84443 ASSAY THYROID STIM HORMONE: CPT

## 2023-01-25 PROCEDURE — 83735 ASSAY OF MAGNESIUM: CPT

## 2023-01-25 PROCEDURE — 74011250636 HC RX REV CODE- 250/636: Performed by: NURSE PRACTITIONER

## 2023-01-25 PROCEDURE — 93306 TTE W/DOPPLER COMPLETE: CPT

## 2023-01-25 PROCEDURE — 74011250637 HC RX REV CODE- 250/637: Performed by: HOSPITALIST

## 2023-01-25 PROCEDURE — 74011000250 HC RX REV CODE- 250: Performed by: HOSPITALIST

## 2023-01-25 RX ORDER — LABETALOL HCL 20 MG/4 ML
20 SYRINGE (ML) INTRAVENOUS
Status: DISCONTINUED | OUTPATIENT
Start: 2023-01-25 | End: 2023-01-26 | Stop reason: HOSPADM

## 2023-01-25 RX ORDER — LORAZEPAM 2 MG/ML
0.5 INJECTION INTRAMUSCULAR ONCE
Status: COMPLETED | OUTPATIENT
Start: 2023-01-25 | End: 2023-01-25

## 2023-01-25 RX ORDER — METOPROLOL TARTRATE 25 MG/1
12.5 TABLET, FILM COATED ORAL EVERY 12 HOURS
Status: DISCONTINUED | OUTPATIENT
Start: 2023-01-25 | End: 2023-01-26 | Stop reason: HOSPADM

## 2023-01-25 RX ADMIN — METOPROLOL TARTRATE 12.5 MG: 25 TABLET, FILM COATED ORAL at 13:18

## 2023-01-25 RX ADMIN — ATORVASTATIN CALCIUM 10 MG: 20 TABLET, FILM COATED ORAL at 18:21

## 2023-01-25 RX ADMIN — APIXABAN 10 MG: 5 TABLET, FILM COATED ORAL at 21:12

## 2023-01-25 RX ADMIN — Medication 10 ML: at 21:12

## 2023-01-25 RX ADMIN — BUDESONIDE 500 MCG: 0.5 INHALANT RESPIRATORY (INHALATION) at 19:27

## 2023-01-25 RX ADMIN — LORAZEPAM 0.5 MG: 2 INJECTION INTRAMUSCULAR; INTRAVENOUS at 00:24

## 2023-01-25 RX ADMIN — BUDESONIDE 500 MCG: 0.5 INHALANT RESPIRATORY (INHALATION) at 07:08

## 2023-01-25 RX ADMIN — BENZONATATE 100 MG: 100 CAPSULE ORAL at 21:12

## 2023-01-25 RX ADMIN — BENZONATATE 100 MG: 100 CAPSULE ORAL at 10:18

## 2023-01-25 RX ADMIN — APIXABAN 10 MG: 5 TABLET, FILM COATED ORAL at 10:18

## 2023-01-25 RX ADMIN — IOPAMIDOL 100 ML: 755 INJECTION, SOLUTION INTRAVENOUS at 13:00

## 2023-01-25 RX ADMIN — FAMOTIDINE 20 MG: 20 TABLET, FILM COATED ORAL at 21:12

## 2023-01-25 RX ADMIN — IPRATROPIUM BROMIDE AND ALBUTEROL SULFATE 3 ML: 2.5; .5 SOLUTION RESPIRATORY (INHALATION) at 13:22

## 2023-01-25 RX ADMIN — IPRATROPIUM BROMIDE AND ALBUTEROL SULFATE 3 ML: 2.5; .5 SOLUTION RESPIRATORY (INHALATION) at 07:08

## 2023-01-25 RX ADMIN — Medication 10 ML: at 13:19

## 2023-01-25 RX ADMIN — BENZONATATE 100 MG: 100 CAPSULE ORAL at 16:25

## 2023-01-25 RX ADMIN — Medication 5 ML: at 06:00

## 2023-01-25 RX ADMIN — IPRATROPIUM BROMIDE AND ALBUTEROL SULFATE 3 ML: 2.5; .5 SOLUTION RESPIRATORY (INHALATION) at 19:22

## 2023-01-25 RX ADMIN — FAMOTIDINE 20 MG: 20 TABLET, FILM COATED ORAL at 10:18

## 2023-01-25 RX ADMIN — METOPROLOL TARTRATE 12.5 MG: 25 TABLET, FILM COATED ORAL at 21:12

## 2023-01-25 NOTE — PROGRESS NOTES
ORTHOPAEDIC CERVICAL FUSION PROGRESS NOTE    NAME:     Kary Valdes   :       1975   MRN:       [id number]   DATE:      2023    SUBJECTIVE:  Patient reports that her neck pain is much better compared to yesterday  No arm pain or numbness  Denies nausea/vomiting, headache, chest pain or shortness of breath  Pain controlled, feeling better    Afebrile    Recent Labs     23  0456 23  0517   HGB 11.3* 11.1*   HCT 34.8* 33.9*   INR  --  1.0    137   K 4.7 3.7    105   CO2 26 28   BUN 10 9   CREA 0.63 0.43*   * 95     Patient Vitals for the past 12 hrs:   BP Temp Pulse Resp SpO2 Height Weight   23 1322 -- -- -- -- 97 % -- --   23 1315 116/81 98.1 °F (36.7 °C) (!) 110 18 97 % -- --   23 0903 108/72 98.1 °F (36.7 °C) (!) 121 20 100 % -- --   23 0822 102/62 -- -- -- -- 5' 2\" (1.575 m) 49.6 kg (109 lb 5.6 oz)   23 0709 -- -- -- -- 99 % -- --   23 0400 102/62 98 °F (36.7 °C) (!) 125 19 99 % -- --       Exam:  Soft collar inplace / intact  Dressings clean and dry. No significant L anterior neck swelling. Neck is soft. No s/s of infx  Positive strength/ROM bilat upper ext. Neuro intact to sensation  BL UEs NVID  Alert, awake and appropriate. Non-focal exam     Calves soft and NTTP  No peripheral edema  BL LEs NVID.          PLAN:  Continue Eliquis per medical team rec's  Continue prn pain medications- oral dilaudid with IV dilaudid for breakthrough if needed  Tolerating regular diet  Appreciate Hospitalist and Vascular Sx teams        Aida Kraus, [address]  Orthopaedic Surgery  Physician Assistant to Dr. Milan Ryan

## 2023-01-25 NOTE — PROGRESS NOTES
RRT evaluation:    MEWS 4  Sepsis Score 1  Deterioration Index 39    Spoke with provider at bedside regarding pt BP and HR. Pt resting comfortably in bed with no s/s of distress, however pt does endorse back pain. Provider ordered ativan for anxiety. Will continue to monitor. 0428:  improving closer to baseline. Per primary RN pt feeling \"normal\". MEWS Score 3.      Helene Acuna RN

## 2023-01-25 NOTE — PROGRESS NOTES
Boby Robison [facility]  [address] [facility], [address] [place] [address]  [phone]      Hospitalist Progress Note      NAME: Valdez Vargas   :  1975  MRM:  [id number]    Date/Time of service: 2023  11:05 AM       Subjective:     Chief Complaint:  Patient was personally seen and examined by me during this time period. Chart reviewed. Remains tachycardic, but no chest pain       Objective:       Vitals:       Last 24hrs VS reviewed since prior progress note.  Most recent are:    Visit Vitals  /72 (BP 1 Location: Left upper arm, BP Patient Position: At rest;Sitting)   Pulse (!) 121   Temp 98.1 °F (36.7 °C)   Resp 20   Ht 5' 2\" (1.575 m)   Wt 49.6 kg (109 lb 5.6 oz)   SpO2 100%   BMI 20.00 kg/m²     SpO2 Readings from Last 6 Encounters:   23 100%   23 99%   23 99%          Intake/Output Summary (Last 24 hours) at 2023 1105  Last data filed at 2023 1622  Gross per 24 hour   Intake 480 ml   Output --   Net 480 ml          Exam:     Physical Exam:    Gen:  Well-developed, well-nourished, in no acute distress  HEENT:  Pink conjunctivae, PERRL, hearing intact to voice, moist mucous membranes  Neck:  Supple, has cervical collar   Resp:  No accessory muscle use, clear breath sounds without wheezes rales or rhonchi  Card:  No murmurs, tachy, normal S1, S2 without thrills, bruits or peripheral edema  Abd:  Soft, non-tender, non-distended, normoactive bowel sounds are present  Musc:  No cyanosis or clubbing  Skin:  No rashes or ulcers, skin turgor is good  Neuro:  Cranial nerves 3-12 are grossly intact, follows commands appropriately  Psych:  Good insight, oriented to person, place and time, alert    Medications Reviewed: (see below)    Lab Data Reviewed: (see below)    ______________________________________________________________________    Medications:     Current Facility-Administered Medications   Medication Dose Route Frequency    apixaban (ELIQUIS) tablet 10 mg  10 mg Oral Q12H    [START ON 1/31/2023] apixaban (ELIQUIS) tablet 5 mg  5 mg Oral Q12H    hydrALAZINE (APRESOLINE) 20 mg/mL injection 10 mg  10 mg IntraVENous Q6H PRN    cyclobenzaprine (FLEXERIL) tablet 10 mg  10 mg Oral TID PRN    scopolamine (TRANSDERM-SCOP) 1 mg over 3 days 1 Patch  1 Patch TransDERmal Q72H    HYDROmorphone (DILAUDID) tablet 2 mg  2 mg Oral Q3H PRN    HYDROmorphone (DILAUDID) tablet 4 mg  4 mg Oral Q3H PRN    HYDROmorphone (DILAUDID) syringe 0.5 mg  0.5 mg IntraVENous Q3H PRN    famotidine (PEPCID) tablet 20 mg  20 mg Oral Q12H    sodium chloride (NS) flush 5-40 mL  5-40 mL IntraVENous Q8H    sodium chloride (NS) flush 5-40 mL  5-40 mL IntraVENous PRN    acetaminophen (TYLENOL) tablet 650 mg  650 mg Oral Q6H PRN    Or    acetaminophen (TYLENOL) suppository 650 mg  650 mg Rectal Q6H PRN    polyethylene glycol (MIRALAX) packet 17 g  17 g Oral DAILY PRN    ondansetron (ZOFRAN) injection 4 mg  4 mg IntraVENous Q6H PRN    albuterol-ipratropium (DUO-NEB) 2.5 MG-0.5 MG/3 ML  3 mL Nebulization Q6H RT    budesonide (PULMICORT) 500 mcg/2 ml nebulizer suspension  500 mcg Nebulization BID RT    benzonatate (TESSALON) capsule 100 mg  100 mg Oral TID    atorvastatin (LIPITOR) tablet 10 mg  10 mg Oral QPM    losartan (COZAAR) tablet 50 mg  50 mg Oral QPM    albuterol-ipratropium (DUO-NEB) 2.5 MG-0.5 MG/3 ML  3 mL Nebulization Q4H PRN          Lab Review:     Recent Labs     01/25/23  0456 01/23/23  0517 01/22/23  1630   WBC 8.6 8.7 9.2   HGB 11.3* 11.1* 12.6   HCT 34.8* 33.9* 38.0    228 240       Recent Labs     01/25/23  0456 01/23/23  0517    137   K 4.7 3.7    105   CO2 26 28   * 95   BUN 10 9   CREA 0.63 0.43*   CA 8.9 8.3*   MG 2.3 2.0   PHOS 3.0 2.7   ALB  --  2.7*   TBILI  --  0.9   ALT  --  13   INR  --  1.0       No results found for: GLUCPOC       Assessment / Plan:     53 yo hx of HTN, asthma, cervical spine stenosis s/p recent surgery, presented w/ neck pain, acute L IJ thrombosis    1) Acute L IJ thrombosis: likely a surgical complication. Will cont Eliquis (new). Vascular was following, no interventions at this time    2) Sinus tachycardia: persistent. Will obtain chest CTA to r/o PE, echo. Add low dose metoprolol. Monitor closely    3) Cervical spine stenosis: s/p recent surg. Ortho cleared for full anticoagulation.   Cont IV dilaudid prn severe pain    4) Asthma: use nebs prns, incentive spirometry    5) HTN: cont losartan, add low dose metoprolol     **Prior records, notes, labs, radiology, and medications reviewed in [facility]**    Total time spent with patient care: 35 min  **I personally saw and examined the patient during this time period**                 Care Plan discussed with: Patient, nursing     Discussed:  Care Plan    Prophylaxis:  eliquis     Disposition:  Home w/Family           ___________________________________________________    Attending Physician: Juan Ramon Yang MD

## 2023-01-25 NOTE — PROGRESS NOTES
1/25/2023 12:55 PM   Care Management Progress Note      ICD-10-CM ICD-9-CM    1. Tachycardia  R00.0 785.0       2. Neck pain  M54.2 723.1       3. Thrombus  I82.90 453.9           RUR:  7%  Risk Level: [x]Low []Moderate []High  Value-based purchasing: [] Yes [x] No  Bundle patient: [] Yes [x] No   Specify:     Transition of care plan:  Ongoing medical management, on Eliquis  Home with family at discharge, pt was provided a Eliquis coupon   Outpatient follow-up.   Pt's family to transport

## 2023-01-25 NOTE — ADVANCED PRACTICE NURSE
Asked by nursing to come to bedside  as pt with elevated HR post administration of hydralizine due to BP in 170's. Pt now feeling weak, flushed and can feel her heart beating. HR up to 140's at this time. EKG from earlier with Sinus tachy. BP down to 110's initally when I arrived now up to 668 systolic. Pt anxious and tearful,states does not like the feeling of her heart beating fast.     Will provide small IV dose of ativan for her current anxiety, will trend HR at this time.

## 2023-01-26 VITALS
HEIGHT: 62 IN | SYSTOLIC BLOOD PRESSURE: 142 MMHG | TEMPERATURE: 98 F | HEART RATE: 104 BPM | DIASTOLIC BLOOD PRESSURE: 87 MMHG | OXYGEN SATURATION: 100 % | BODY MASS INDEX: 20.12 KG/M2 | RESPIRATION RATE: 18 BRPM | WEIGHT: 109.35 LBS

## 2023-01-26 PROBLEM — I82.C19: Status: ACTIVE | Noted: 2023-01-26

## 2023-01-26 PROCEDURE — 74011250637 HC RX REV CODE- 250/637: Performed by: INTERNAL MEDICINE

## 2023-01-26 PROCEDURE — 94640 AIRWAY INHALATION TREATMENT: CPT

## 2023-01-26 PROCEDURE — 94761 N-INVAS EAR/PLS OXIMETRY MLT: CPT

## 2023-01-26 PROCEDURE — 74011250637 HC RX REV CODE- 250/637: Performed by: HOSPITALIST

## 2023-01-26 PROCEDURE — 74011000250 HC RX REV CODE- 250: Performed by: HOSPITALIST

## 2023-01-26 PROCEDURE — 74011250637 HC RX REV CODE- 250/637: Performed by: PHYSICIAN ASSISTANT

## 2023-01-26 RX ORDER — METOPROLOL TARTRATE 25 MG/1
12.5 TABLET, FILM COATED ORAL EVERY 12 HOURS
Qty: 60 TABLET | Refills: 0 | Status: SHIPPED | OUTPATIENT
Start: 2023-01-26

## 2023-01-26 RX ORDER — HYDROMORPHONE HYDROCHLORIDE 2 MG/1
2-4 TABLET ORAL
Qty: 60 TABLET | Refills: 0 | Status: SHIPPED | OUTPATIENT
Start: 2023-01-26 | End: 2023-02-02

## 2023-01-26 RX ADMIN — APIXABAN 10 MG: 5 TABLET, FILM COATED ORAL at 10:15

## 2023-01-26 RX ADMIN — METOPROLOL TARTRATE 12.5 MG: 25 TABLET, FILM COATED ORAL at 10:15

## 2023-01-26 RX ADMIN — Medication 10 ML: at 06:13

## 2023-01-26 RX ADMIN — BENZONATATE 100 MG: 100 CAPSULE ORAL at 10:15

## 2023-01-26 RX ADMIN — FAMOTIDINE 20 MG: 20 TABLET, FILM COATED ORAL at 10:15

## 2023-01-26 RX ADMIN — IPRATROPIUM BROMIDE AND ALBUTEROL SULFATE 3 ML: 2.5; .5 SOLUTION RESPIRATORY (INHALATION) at 08:23

## 2023-01-26 RX ADMIN — IPRATROPIUM BROMIDE AND ALBUTEROL SULFATE 3 ML: 2.5; .5 SOLUTION RESPIRATORY (INHALATION) at 01:00

## 2023-01-26 RX ADMIN — BUDESONIDE 500 MCG: 0.5 INHALANT RESPIRATORY (INHALATION) at 08:27

## 2023-01-26 NOTE — DISCHARGE INSTRUCTIONS
HOSPITALIST DISCHARGE INSTRUCTIONS  NAME: Dar Vargas   :  1975   MRN:  [id number]     Date/Time:  2023 11:32 AM    ADMIT DATE: 2023     DISCHARGE DATE: 2023     ADMITTING DIAGNOSIS:  Acute left internal jugular thrombosis    DISCHARGE DIAGNOSIS:  same    MEDICATIONS:  See after visit summary       It is important that you take the medication exactly as they are prescribed. Keep your medication in the bottles provided by the pharmacist and keep a list of the medication names, dosages, and times to be taken in your wallet. Do not take other medications without consulting your doctor     Pain Management: per above medications    What to do at Home    Recommended diet:  Regular Diet    Recommended activity: See surgical instructions    1) Return to the hospital if you feel worse    2) If you experience any of the following symptoms then please call your primary care physician or return to the emergency room if you cannot get hold of your doctor:  Fever, chills, nausea, vomiting, diarrhea, change in mentation, falling, bleeding, shortness of breath, chest pain, severe headache, severe abdominal pain,     3) Follow up with your doctors as directed    4) Take Eliquis as directed to treat blood clots. You will need therapy for 3 months     Follow Up:  Jerri Wagner, [address] [place] [address]  [phone]    Schedule an appointment as soon as possible for a visit in 1 week(s)    . Information obtained by :  I understand that if any problems occur once I am at home I am to contact my physician. I understand and acknowledge receipt of the instructions indicated above.                                                                                                                                            Physician's or R.N.'s Signature                                                                  Date/Time Patient or Representative Signature                                                          Date/Time          Deep Vein Thrombosis: Care Instructions  Overview     A deep vein thrombosis (DVT) is a blood clot in certain veins, usually in the legs, pelvis, or arms. Blood clots in these veins need to be treated because they can get bigger, break loose, and travel through the bloodstream to the lungs. A blood clot in a lung can be life-threatening. The doctor may have given you a blood thinner (anticoagulant). A blood thinner can stop the blood clot from growing larger and prevent new clots from forming. You will need to take a blood thinner for at least 3 months. The doctor has checked you carefully, but problems can develop later. If you notice any problems or new symptoms, get medical treatment right away. Follow-up care is a key part of your treatment and safety. Be sure to make and go to all appointments, and call your doctor if you are having problems. It's also a good idea to know your test results and keep a list of the medicines you take. How can you care for yourself at home? Take your medicines exactly as prescribed. Call your doctor if you think you are having a problem with your medicine. If you are taking a blood thinner, be sure you get instructions about how to take your medicine safely. Blood thinners can cause serious bleeding problems. Try to walk several times a day. Wear compression stockings if your doctor recommends them. These stockings are tighter at the feet than on the legs. They may reduce pain and swelling in your legs. But there are different types of stockings, and they need to fit right. So your doctor will recommend what you need. When you sit, use a pillow to raise the arm or leg that has the blood clot. Try to keep it above the level of your heart. When should you call for help? Call 911 anytime you think you may need emergency care. For example, call if:    You passed out (lost consciousness). You have symptoms of a blood clot in your lung (called a pulmonary embolism). These include:  Sudden chest pain. Trouble breathing. Coughing up blood. Call your doctor now or seek immediate medical care if:    You have new or worse trouble breathing. You are dizzy or lightheaded, or you feel like you may faint. You have symptoms of a blood clot in your arm or leg. These may include:  Pain in the arm, calf, back of the knee, thigh, or groin. Redness and swelling in the arm, leg, or groin. Watch closely for changes in your health, and be sure to contact your doctor if:    You do not get better as expected. Where can you learn more? Go to http://www.gray.com/  Enter R829 in the search box to learn more about \"Deep Vein Thrombosis: Care Instructions. \"  Current as of: March 28, 2022               Content Version: 13.4  © 2006-2022 [employer]. Care instructions adapted under license by [employer] (which disclaims liability or warranty for this information). If you have questions about a medical condition or this instruction, always ask your healthcare professional. [place] [address] any warranty or liability for your use of this information. STOP TAKING ANY PREVIOUSLY PRESCRIBED PAIN MEDICATION. YOU WERE GIVEN A NEW PRESCRIPTION FOR PAIN MEDICATION UPON DISCHARGE FROM [address] [place] [address] Drive                  Gena Martin MD  [facility]  Office Phone: [phone]  Neck Surgery Discharge Instructions    Activities  You are going home a well person, be as active as possible. Your only exercise should be walking. Start with short frequent walks and increase your walking distance each day. Start with walking twice a day for 5 minutes and increase your distance each day 2-3 minutes until you reach 25 minutes twice a day. Limit the amount of time you sit to 20-30 minute intervals. Sitting for prolonged periods of time will be uncomfortable for you following your surgery. Do not lift anything over five pounds, and do not do any bending or straining. Avoid reaching overhead in this post-operative period  Do not do any neck exercises until you have been instructed by your doctor. When you are in the bed, you may lay on your back or on either side. Do not lie on your stomach. Continue using your incentive spirometer regularly for deep breathing exercises  You may resume sexual relations 3-4 weeks after your surgery, depending on how you are feeling. Diet  You may resume your normal diet. If your throat is sore, you may want to eat soft foods for a few days. Be sure to drink plenty of fluids, it is important to keep yourself hydrated. If you begin having trouble swallowing, call the office immediately. Avoid alcoholic beverages and ABSOLUTELY NO tobacco products. Tobacco products will interfere with your healing. If you continue to use tobacco, you may end up needing another surgery in the future. Medications  Do not take anti-inflammatory medications or aspirin unless instructed by your physician. Take your pain medication as directed. Do NOT take additional Tylenol if your prescribed pain medication has acetaminophen in it (Endocet/Percocet, Lortab, Norco). It is important to have regular bowel movements. Pain medications may cause constipation. Stool softeners, prune juice, and increasing your water and fiber intake may help in preventing constipation. Do NOT take laxatives if at all possible except in severe situations. It can results in a vicious cycle of constipation and diarrhea. Do not be alarmed if you still have some of the same symptoms you had prior to surgery. The nerves often require time to heal after the pressure has been relieved.  You may experience pain in your shoulders or between your shoulder blades, which is common after this surgery. The level of pain you experience should improve as your body heals. Driving  You may not drive or return to work until instructed by your physician. However, you may ride in the car for short periods of time. Neck collar  Wear your neck brace. You may remove it for short breaks, when eating or showering. You must keep the brace on while sleeping and when ambulating. Showering  You may shower in approximately 5 days after your surgery if your incision is not draining. You may remove your brace during showers. Do not rub or apply lotion or ointments to the incision site. Do not use tub baths, swimming pools or Jacuzzis. Caring for your incision  Keep the clear, plastic dressing on until 3 days after surgery. At that point, if the incision is dry and without drainage, you may keep the wound open to air without cover. You may have steri-strips on your incision (small, white pieces of tape). Do not pull the steri-strips; they will fall off on their own after several days. If you have sutures or staples, they will be removed by home health or when you see your physician. Do not rub or apply any lotions or ointments to your incision site. Follow Up  Once you are home, call your physicians office to schedule an appointment 2-3 weeks after surgery. Notify your physician if you develop any of the following conditions:  Fever above 101 degrees for 24 hours. Nausea or vomiting. Severe headache. Inability to urinate. Loss of bowel or bladder control (sudden onset of incontinence). Changes in sensation in your extremities (numbness, tingling, loss of color). Severe pain or pain not relieved by medications. Redness, swelling, or drainage from your incision. Persistent pain in the chest.   Pain in the calf of either leg. Increased weakness (if this is greater than before your surgery).     If you have any questions, contact your Orthopaedic Surgeons office. * WEAR YOUR BRACE AS ADVISED    * NO DRIVING UNTIL YOU ARE CLEARED TO DO SO BY YOUR SURGEON    * LIMIT LIFTING, BENDING AND TWISTING. NO LIFTING MORE THAN 5 LBS    * MAKE SURE YOU ARE GETTING GOOD NUTRITION (Lean Protein, Vitamin D AND Calcium)    * DO NOT TAKE ANY NSAIDs FOR THE FIRST 3 MONTHS AFTER SURGERY (such as Advil/Ibuprofen/Motrin, Aleve/Naproxen/Naprosyn, Diclofenac, Celebrex, Meloxicam, Indomethacin, Goody's powder, BC powder etc.)    * NO NICOTINE PRODUCTS    * FULLY READ YOUR DISCHARGE INSTRUCTIONS    FOR PRESCRIPTION REFILLS, DR. Rubens Rojas OFFICE NEEDS TO BE CONTACTED 24-48 HOURS PRIOR TO YOU RUNNING OUT OF YOUR MEDICATION(S).  PLEASE KEEP IN MIND THAT PRESCRIPTIONS CANNOT ALWAYS BE REFILLED ON MONDAYS AND TUESDAYS DUE TO OUR OR SCHEDULE    OFFICE OF DR. Isaias Ferrer     [address] [facility], [facility], [address] [facility], [address] [facility]   DIRECT OFFICE PHONE NUMBER: [phone]  FAX: [phone]

## 2023-01-26 NOTE — PROGRESS NOTES
ORTHOPAEDIC CERVICAL FUSION PROGRESS NOTE    NAME:     Oral Wilkes   :       1975   MRN:       [id number]   DATE:      2023    SUBJECTIVE:  Reports that her pain is improved   No arm pain or numbness  Denies dysphagia, dizziness, palpitations, nausea/vomiting, headache, chest pain or shortness of breath  Pain controlled    Recent Labs     23  0456   HGB 11.3*   HCT 34.8*      K 4.7      CO2 26   BUN 10   CREA 0.63   *     Patient Vitals for the past 12 hrs:   BP Temp Pulse Resp SpO2   23 1216 138/88 98 °F (36.7 °C) 100 18 100 %   23 0854 129/78 98.1 °F (36.7 °C) (!) 115 18 100 %   23 0827 -- -- -- -- 98 %   23 0338 (!) 138/97 98 °F (36.7 °C) (!) 103 16 100 %       Exam:  Soft collar inplace / intact  Dressings clean and dry. No significant L anterior neck swelling. Neck is soft. No s/s of infx (unchanged)  Positive strength/ROM bilat upper ext. Neuro intact to sensation  BL UEs NVID  Alert, awake and appropriate. Non-focal exam     Calves soft and NTTP  No peripheral edema  BL LEs NVID. PLAN:  Continue Eliquis per medical team rec's  Continue prn pain medications- oral dilaudid with IV dilaudid for breakthrough if needed  Tolerating regular diet  Appreciate Hospitalist and Vascular Sx teams         Berenice Bound, [address]  Orthopaedic Surgery  Physician Assistant to Dr. Marjan Hawk    Current Discharge Medication List        START taking these medications    Details   metoprolol tartrate (LOPRESSOR) 25 mg tablet Take 0.5 Tablets by mouth every twelve (12) hours.   Qty: 60 Tablet, Refills: 0  Start date: 2023      apixaban (Eliquis) 5 mg tablet Take 10mg (2 tabs) twice daily for 4 days, then decreased to 5mg (1 tab) twice daily  Indications: blood clot in a deep vein of the extremities  Qty: 60 Tablet, Refills: 1  Start date: 2023      HYDROmorphone (DILAUDID) 2 mg tablet Take 1-2 Tablets by mouth every four (4) hours as needed (post-operative pain) for up to 7 days. Max Daily Amount: 24 mg. Qty: 60 Tablet, Refills: 0  Start date: 1/26/2023, End date: 2/2/2023    Associated Diagnoses: Status post cervical spinal fusion; Postoperative pain           CONTINUE these medications which have NOT CHANGED    Details   docusate sodium (COLACE) 100 mg capsule Take 1 Capsule by mouth two (2) times a day for 30 days. Indications: constipation  Qty: 60 Capsule, Refills: 0      fluticasone propionate (FLONASE) 50 mcg/actuation nasal spray 2 Sprays by Both Nostrils route two (2) times a day. Qty: 1 Each, Refills: 0      azelastine HCl (AZELASTINE NA) by Nasal route daily. Daily in AM      omeprazole (PRILOSEC) 40 mg capsule Take 40 mg by mouth daily. Daily in AM      cetirizine (ZYRTEC) 10 mg tablet Take 10 mg by mouth daily. Daily in AM      ALBUTEROL SULFATE IN Take  by inhalation as needed. 0.5mg/3mg per 3mL      cyclobenzaprine (FLEXERIL) 10 mg tablet Take 1 Tablet by mouth three (3) times daily as needed for Muscle Spasm(s). Qty: 30 Tablet, Refills: 0      ondansetron (ZOFRAN ODT) 4 mg disintegrating tablet Take 1 Tablet by mouth every six (6) hours as needed for Nausea or Vomiting. Qty: 30 Tablet, Refills: 0      atorvastatin (LIPITOR) 10 mg tablet Take 10 mg by mouth every evening. Daily in PM      montelukast (SINGULAIR) 10 mg tablet Take 10 mg by mouth every evening. Daily in PM           STOP taking these medications       hydroCHLOROthiazide (HYDRODIURIL) 12.5 mg tablet Comments:   Reason for Stopping:         HYDROcodone-acetaminophen (NORCO) 5-325 mg per tablet Comments:   Reason for Stopping:         losartan (COZAAR) 50 mg tablet Comments:   Reason for Stopping:             Patient specifically advised to stop taking Norco. Dilaudid Rx escribed to pharmacy.  Pt voices understanding

## 2023-01-26 NOTE — DISCHARGE SUMMARY
Boby Robison [facility]  [address] [facility], [address] [place] [address]  [phone]    Hospitalist Discharge Summary     Patient ID:  Jose Hughes  [id number]  52 y.o.  1975    Admit date: 1/22/2023    Discharge date and time: 1/26/2023 11:33 AM    Admission Diagnoses: Tachycardia [R00.0]    Discharge Diagnoses:  Principal Diagnosis Acute internal jugular vein thrombosis ([address]                                            Principal Problem:    Acute internal jugular vein thrombosis ([address] (1/26/2023)    Active Problems:    Tachycardia (1/22/2023)           Hospital Course:     53 yo hx of HTN, asthma, cervical spine stenosis s/p recent surgery, presented w/ neck pain, acute L IJ thrombosis     1) Acute L IJ thrombosis: likely a surgical complication. Will cont Eliquis (new). Vascular was following, no interventions at this time. Will need treatment for 3 months      2) Sinus tachycardia: persistent, but improving. Chest CTA neg for PE. Echo was normal.  TSH wnl. Tachycardia likely related to pain, recent surgery     3) Cervical spine stenosis: s/p recent surg. Ortho cleared for full anticoagulation     4) Asthma: use nebs prns, incentive spirometry     5) HTN: stopped losartan.   Added low dose metoprolol to help with tachycardia     PCP: Other, Phys, MD     Consults:  Vascular, Ortho    Significant Diagnostic Studies: CTA chest/neck    Discharge Exam:  Physical Exam:    Gen:  Well-developed, well-nourished, in no acute distress  HEENT:  Pink conjunctivae, PERRL, hearing intact to voice, moist mucous membranes  Neck:  Supple, has cervical collar   Resp:  No accessory muscle use, clear breath sounds without wheezes rales or rhonchi  Card:  No murmurs, tachy, normal S1, S2 without thrills, bruits or peripheral edema  Abd:  Soft, non-tender, non-distended, normoactive bowel sounds are present  Musc:  No cyanosis or clubbing  Skin:  No rashes or ulcers, skin turgor is good  Neuro:  Cranial nerves 3-12 are grossly intact, follows commands appropriately  Psych:  Good insight, oriented to person, place and time, alert    Disposition: home  Discharge Condition: Stable    Patient Instructions:   Current Discharge Medication List        START taking these medications    Details   metoprolol tartrate (LOPRESSOR) 25 mg tablet Take 0.5 Tablets by mouth every twelve (12) hours. Qty: 60 Tablet, Refills: 0      apixaban (Eliquis) 5 mg tablet Take 10mg (2 tabs) twice daily for 4 days, then decreased to 5mg (1 tab) twice daily  Indications: blood clot in a deep vein of the extremities  Qty: 60 Tablet, Refills: 1           CONTINUE these medications which have NOT CHANGED    Details   docusate sodium (COLACE) 100 mg capsule Take 1 Capsule by mouth two (2) times a day for 30 days. Indications: constipation  Qty: 60 Capsule, Refills: 0      fluticasone propionate (FLONASE) 50 mcg/actuation nasal spray 2 Sprays by Both Nostrils route two (2) times a day. Qty: 1 Each, Refills: 0      azelastine HCl (AZELASTINE NA) by Nasal route daily. Daily in AM      omeprazole (PRILOSEC) 40 mg capsule Take 40 mg by mouth daily. Daily in AM      cetirizine (ZYRTEC) 10 mg tablet Take 10 mg by mouth daily. Daily in AM      ALBUTEROL SULFATE IN Take  by inhalation as needed. 0.5mg/3mg per 3mL      cyclobenzaprine (FLEXERIL) 10 mg tablet Take 1 Tablet by mouth three (3) times daily as needed for Muscle Spasm(s). Qty: 30 Tablet, Refills: 0      ondansetron (ZOFRAN ODT) 4 mg disintegrating tablet Take 1 Tablet by mouth every six (6) hours as needed for Nausea or Vomiting. Qty: 30 Tablet, Refills: 0      HYDROcodone-acetaminophen (NORCO) 5-325 mg per tablet Take 1-2 Tablets by mouth every four (4) hours as needed for Pain for up to 7 days. Max Daily Amount: 12 Tablets. Qty: 42 Tablet, Refills: 0    Associated Diagnoses: Cervical stenosis of spinal canal      atorvastatin (LIPITOR) 10 mg tablet Take 10 mg by mouth every evening.  Daily in PM      montelukast (SINGULAIR) 10 mg tablet Take 10 mg by mouth every evening.  Daily in PM           STOP taking these medications       hydroCHLOROthiazide (HYDRODIURIL) 12.5 mg tablet Comments:   Reason for Stopping:         losartan (COZAAR) 50 mg tablet Comments:   Reason for Stopping:             Activity: Activity as tolerated  Diet: Regular Diet  Wound Care: None needed    Follow-up with  Follow-up Information       Follow up With Specialties Details Why Contact Info    Elizebeth Soulier, MD Orthopedic Surgery Schedule an appointment as soon as possible for a visit in 1 week(s)  [address] [place] [address]  [phone]               Follow-up tests/labs none    Signed:  Darrion Vila MD  1/26/2023  11:33 AM  **I personally spent 35 min on discharge**

## 2023-01-26 NOTE — PROGRESS NOTES
Problem: Treatment of deep venous thrombosis  Goal: *Absence of embolism and improvement of existing deep venous thrombosis  Outcome: Resolved/Met  Goal: *Labs within defined limits  Outcome: Resolved/Met  Goal: *Skin integrity maintained  Outcome: Resolved/Met     Problem: Falls - Risk of  Goal: *Absence of Falls  Description: Document Naomi Fall Risk and appropriate interventions in the flowsheet.   Outcome: Resolved/Met  Note: Fall Risk Interventions:                                Problem: General Medical Care Plan  Goal: *Vital signs within specified parameters  Outcome: Resolved/Met  Goal: *Labs within defined limits  Outcome: Resolved/Met  Goal: *Absence of infection signs and symptoms  Outcome: Resolved/Met  Goal: *Skin integrity maintained  Outcome: Resolved/Met  Goal: *Progressive mobility and function (eg: ADL's)  Outcome: Resolved/Met     Problem: Patient Education: Go to Patient Education Activity  Goal: Patient/Family Education  Outcome: Resolved/Met

## 2023-01-26 NOTE — PROGRESS NOTES
1/26/2023 1:35 PM CM called to [facility], was able to talk with their Pharmacist who reported pt's Eliquis script was received and they have filled scripts. Pt's copay is $30.     1/26/2023  12:14 PM Discharge order noted. CM attempted to call [facility] 3x at [phone], each time phone rings busy. Pt's family will transport pt home. Pt has Eliquis coupon. [place]     Care Management Progress Note         ICD-10-CM ICD-9-CM     1. Tachycardia  R00.0 785.0         2. Neck pain  M54.2 723.1         3. Thrombus  I82.90 453.9               RUR:  7%  Risk Level: [x]Low []Moderate []High  Value-based purchasing: [] Yes [x] No  Bundle patient: [] Yes [x] No              Specify:      Transition of care plan:  Ongoing medical management, on Eliquis  Home with family at discharge, pt was provided a Eliquis coupon   Outpatient follow-up.   Pt's family to transport    Care Management Interventions  PCP Verified by CM: No  Transition of Care Consult (CM Consult): Discharge Planning  Support Systems: Parent(s)  Confirm Follow Up Transport: Family  The Plan for Transition of Care is Related to the Following Treatment Goals : discharge  The Patient and/or Patient Representative was Provided with a Choice of Provider and Agrees with the Discharge Plan?:  (pt)  Name of the Patient Representative Who was Provided with a Choice of Provider and Agrees with the Discharge Plan: Paula Lamp  Discharge Location  Patient Expects to be Discharged to[de-identified] Home with family assistance

## 2023-01-27 LAB
ATRIAL RATE: 130 BPM
CALCULATED P AXIS, ECG09: 69 DEGREES
CALCULATED R AXIS, ECG10: 41 DEGREES
CALCULATED T AXIS, ECG11: 33 DEGREES
DIAGNOSIS, 93000: NORMAL
P-R INTERVAL, ECG05: 130 MS
Q-T INTERVAL, ECG07: 290 MS
QRS DURATION, ECG06: 76 MS
QTC CALCULATION (BEZET), ECG08: 426 MS
VENTRICULAR RATE, ECG03: 130 BPM

## 2023-05-03 ENCOUNTER — TRANSCRIBE ORDER (OUTPATIENT)
Dept: SCHEDULING | Age: 48
End: 2023-05-03

## 2023-05-03 DIAGNOSIS — I82.C12: Primary | ICD-10-CM

## 2023-05-04 ENCOUNTER — TRANSCRIBE ORDERS (OUTPATIENT)
Facility: HOSPITAL | Age: 48
End: 2023-05-04

## 2023-05-04 DIAGNOSIS — I82.C12 INTERNAL JUGULAR (IJ) VEIN THROMBOEMBOLISM, ACUTE, LEFT (HCC): Primary | ICD-10-CM

## 2023-05-10 ENCOUNTER — TRANSCRIBE ORDERS (OUTPATIENT)
Facility: HOSPITAL | Age: 48
End: 2023-05-10

## 2023-05-10 DIAGNOSIS — I82.C12: Primary | ICD-10-CM

## 2023-05-28 ENCOUNTER — HOSPITAL ENCOUNTER (OUTPATIENT)
Dept: VASCULAR SURGERY | Facility: HOSPITAL | Age: 48
Discharge: HOME | End: 2023-05-30
Attending: ORTHOPAEDIC SURGERY
Payer: COMMERCIAL

## 2023-05-28 DIAGNOSIS — I82.C12: ICD-10-CM

## 2023-05-28 PROCEDURE — 93971 EXTREMITY STUDY: CPT

## 2024-07-13 ENCOUNTER — TRANSCRIBE ORDERS (OUTPATIENT)
Facility: HOSPITAL | Age: 49
End: 2024-07-13

## 2024-07-13 ENCOUNTER — HOSPITAL ENCOUNTER (OUTPATIENT)
Facility: HOSPITAL | Age: 49
End: 2024-07-13
Payer: COMMERCIAL

## 2024-07-13 DIAGNOSIS — J45.998 UNCONTROLLED PERSISTENT ASTHMA: ICD-10-CM

## 2024-07-13 DIAGNOSIS — J45.998 UNCONTROLLED PERSISTENT ASTHMA: Primary | ICD-10-CM

## 2024-07-13 PROCEDURE — 71046 X-RAY EXAM CHEST 2 VIEWS: CPT

## (undated) DEVICE — DRAPE MICSCP W46XL120IN FOR ZEISS MD FEATURING CLEARLENS

## (undated) DEVICE — BIT DRL L12MM DIA2.3MM CERV STP W/ EPOXY RNG DISP PYRENEES

## (undated) DEVICE — ACCY PA100-A LEGEND LUB/DIFFUSER 4 PACK: Brand: MIDAS REX®

## (undated) DEVICE — TIP CLEANER: Brand: VALLEYLAB

## (undated) DEVICE — SUTURE VCRL SZ 3-0 L27IN ABSRB UD L26MM SH 1/2 CIR J416H

## (undated) DEVICE — GOWN,SIRUS,NONRNF,SETINSLV,XL,20/CS: Brand: MEDLINE

## (undated) DEVICE — STRIP,CLOSURE,WOUND,MEDI-STRIP,1/2X4: Brand: MEDLINE

## (undated) DEVICE — SPONGE GZ W4XL4IN COT 12 PLY TYP VII WVN C FLD DSGN STERILE

## (undated) DEVICE — KIT EVAC 400CC DIA1/8IN H PAT 12.5IN 3 SPR RND SHP PVC DRN

## (undated) DEVICE — SUTURE MCRYL SZ 4-0 L27IN ABSRB UD L19MM PS-2 1/2 CIR PRIM Y426H

## (undated) DEVICE — SOLUTION IRRIG 1000ML STRL H2O USP PLAS POUR BTL

## (undated) DEVICE — APPLICATOR MEDICATED 10.5 CC SOLUTION HI LT ORNG CHLORAPREP

## (undated) DEVICE — SYR 5ML 1/5 GRAD LL NSAF LF --

## (undated) DEVICE — 450 ML BOTTLE OF 0.05% CHLORHEXIDINE GLUCONATE IN 99.95% STERILE WATER FOR IRRIGATION, USP AND APPLICATOR.: Brand: IRRISEPT ANTIMICROBIAL WOUND LAVAGE

## (undated) DEVICE — GLOVE SURG SZ 65 THK91MIL LTX FREE SYN POLYISOPRENE

## (undated) DEVICE — COVER,MAYO STAND,STERILE: Brand: MEDLINE

## (undated) DEVICE — GLOVE ORTHO 8   MSG9480

## (undated) DEVICE — CERVICAL-SFMC: Brand: MEDLINE INDUSTRIES, INC.

## (undated) DEVICE — GLOVE ORANGE PI 7 1/2   MSG9075

## (undated) DEVICE — TAPE,CLOTH/SILK,CURAD,3"X10YD,LF,40/CS: Brand: CURAD

## (undated) DEVICE — TOOL 14MH30 LEGEND 14CM 3MM: Brand: MIDAS REX ™

## (undated) DEVICE — 4-PORT MANIFOLD: Brand: NEPTUNE 2

## (undated) DEVICE — 3M™ TEGADERM™ TRANSPARENT FILM DRESSING FRAME STYLE, 1624W, 2-3/8 IN X 2-3/4 IN (6 CM X 7 CM), 100/CT 4CT/CASE: Brand: 3M™ TEGADERM™

## (undated) DEVICE — DRAPE,REIN 53X77,STERILE: Brand: MEDLINE

## (undated) DEVICE — CATHETER IV 14GA L1.25IN TEF STR HUB INTROCAN SFTY

## (undated) DEVICE — GLOVE SURG SZ 7 L12IN FNGR THK79MIL GRN LTX FREE

## (undated) DEVICE — DRAPE,CHEST,FENES,15X10,STERIL: Brand: MEDLINE

## (undated) DEVICE — GLOVE SURG SZ 8 L12IN FNGR THK79MIL GRN LTX FREE

## (undated) DEVICE — SOLUTION IRRIG 1000ML 0.9% SOD CHL USP POUR PLAS BTL

## (undated) DEVICE — STRAP,POSITIONING,KNEE/BODY,FOAM,4X60": Brand: MEDLINE

## (undated) DEVICE — 3M™ TEGADERM™ TRANSPARENT FILM DRESSING FRAME STYLE, 1626W, 4 IN X 4-3/4 IN (10 CM X 12 CM), 50/CT 4CT/CASE: Brand: 3M™ TEGADERM™

## (undated) DEVICE — AGENT HEMOSTATIC SURGIFLOW MATRIX KIT W/THROMBIN

## (undated) DEVICE — ALCOHOL RUBBING ISO 16OZ 70%